# Patient Record
Sex: MALE | Race: WHITE | NOT HISPANIC OR LATINO | URBAN - METROPOLITAN AREA
[De-identification: names, ages, dates, MRNs, and addresses within clinical notes are randomized per-mention and may not be internally consistent; named-entity substitution may affect disease eponyms.]

---

## 2024-07-24 PROBLEM — G40.909 SEIZURE DISORDER (MULTI): Status: ACTIVE | Noted: 2024-07-24

## 2024-08-06 ENCOUNTER — TELEPHONE (OUTPATIENT)
Dept: NEUROLOGY | Facility: HOSPITAL | Age: 34
End: 2024-08-06

## 2024-08-06 DIAGNOSIS — G40.909 SEIZURE DISORDER (MULTI): Primary | ICD-10-CM

## 2024-08-06 RX ORDER — LEVETIRACETAM 750 MG/1
750 TABLET ORAL 2 TIMES DAILY
Qty: 60 TABLET | Refills: 2 | Status: SHIPPED | OUTPATIENT
Start: 2024-08-06 | End: 2024-11-04

## 2024-10-21 DIAGNOSIS — G40.909 SEIZURE DISORDER (MULTI): ICD-10-CM

## 2024-10-21 RX ORDER — LEVETIRACETAM 750 MG/1
750 TABLET ORAL 2 TIMES DAILY
Qty: 60 TABLET | Refills: 2 | Status: SHIPPED | OUTPATIENT
Start: 2024-10-21 | End: 2025-01-19

## 2024-10-21 NOTE — TELEPHONE ENCOUNTER
Copied from CRM #[id number]. Topic: Transfer to Department for Scheduling  >> Oct 21, 2024  3:37 PM Ching MATOS wrote:  Patient needs a refill on medication and a follow up appointment insurance doesn't go into effect until nov 1. Please reach out for scheduling and med refill.        --------------  Called and spoke to patient. Scheduled appointment with Ne in November (new insurance is not active until 11/1/24).  Needs STAT refill of Keppra 750mg 1 tab BID... last dose is tonight on 10/21/24.

## 2024-10-23 ENCOUNTER — APPOINTMENT (OUTPATIENT)
Dept: NEUROLOGY | Facility: HOSPITAL | Age: 34
End: 2024-10-23
Payer: COMMERCIAL

## 2024-11-19 ENCOUNTER — OFFICE VISIT (OUTPATIENT)
Dept: NEUROLOGY | Facility: HOSPITAL | Age: 34
End: 2024-11-19
Payer: COMMERCIAL

## 2024-11-19 ENCOUNTER — LAB (OUTPATIENT)
Dept: LAB | Facility: LAB | Age: 34
End: 2024-11-19
Payer: COMMERCIAL

## 2024-11-19 VITALS
BODY MASS INDEX: 22.26 KG/M2 | HEART RATE: 75 BPM | WEIGHT: 142.1 LBS | SYSTOLIC BLOOD PRESSURE: 130 MMHG | DIASTOLIC BLOOD PRESSURE: 88 MMHG

## 2024-11-19 DIAGNOSIS — F41.9 ANXIETY AND DEPRESSION: ICD-10-CM

## 2024-11-19 DIAGNOSIS — G40.909 SEIZURE DISORDER (MULTI): ICD-10-CM

## 2024-11-19 DIAGNOSIS — G40.909 SEIZURE DISORDER (MULTI): Primary | ICD-10-CM

## 2024-11-19 DIAGNOSIS — F32.A ANXIETY AND DEPRESSION: ICD-10-CM

## 2024-11-19 LAB
ALBUMIN SERPL BCP-MCNC: 4.3 G/DL (ref 3.4–5)
ALP SERPL-CCNC: 84 U/L (ref 33–120)
ALT SERPL W P-5'-P-CCNC: 43 U/L (ref 10–52)
ANION GAP SERPL CALC-SCNC: 9 MMOL/L (ref 10–20)
AST SERPL W P-5'-P-CCNC: 56 U/L (ref 9–39)
BILIRUB SERPL-MCNC: 0.7 MG/DL (ref 0–1.2)
BUN SERPL-MCNC: 15 MG/DL (ref 6–23)
CALCIUM SERPL-MCNC: 9.2 MG/DL (ref 8.6–10.3)
CHLORIDE SERPL-SCNC: 101 MMOL/L (ref 98–107)
CO2 SERPL-SCNC: 32 MMOL/L (ref 21–32)
CREAT SERPL-MCNC: 0.87 MG/DL (ref 0.5–1.3)
EGFRCR SERPLBLD CKD-EPI 2021: >90 ML/MIN/1.73M*2
ERYTHROCYTE [DISTWIDTH] IN BLOOD BY AUTOMATED COUNT: 13.2 % (ref 11.5–14.5)
GLUCOSE SERPL-MCNC: 112 MG/DL (ref 74–99)
HCT VFR BLD AUTO: 46.5 % (ref 41–52)
HGB BLD-MCNC: 15.7 G/DL (ref 13.5–17.5)
MCH RBC QN AUTO: 33.6 PG (ref 26–34)
MCHC RBC AUTO-ENTMCNC: 33.8 G/DL (ref 32–36)
MCV RBC AUTO: 100 FL (ref 80–100)
NRBC BLD-RTO: 0 /100 WBCS (ref 0–0)
PLATELET # BLD AUTO: 156 X10*3/UL (ref 150–450)
POTASSIUM SERPL-SCNC: 3.7 MMOL/L (ref 3.5–5.3)
PROT SERPL-MCNC: 6.6 G/DL (ref 6.4–8.2)
RBC # BLD AUTO: 4.67 X10*6/UL (ref 4.5–5.9)
SODIUM SERPL-SCNC: 138 MMOL/L (ref 136–145)
WBC # BLD AUTO: 5.4 X10*3/UL (ref 4.4–11.3)

## 2024-11-19 PROCEDURE — 99214 OFFICE O/P EST MOD 30 MIN: CPT | Performed by: NURSE PRACTITIONER

## 2024-11-19 PROCEDURE — 36415 COLL VENOUS BLD VENIPUNCTURE: CPT

## 2024-11-19 PROCEDURE — 85027 COMPLETE CBC AUTOMATED: CPT

## 2024-11-19 PROCEDURE — 80053 COMPREHEN METABOLIC PANEL: CPT

## 2024-11-19 RX ORDER — BUSPIRONE HYDROCHLORIDE 5 MG/1
5 TABLET ORAL 3 TIMES DAILY PRN
Qty: 90 TABLET | Refills: 11 | Status: SHIPPED | OUTPATIENT
Start: 2024-11-19 | End: 2025-11-19

## 2024-11-19 RX ORDER — CETIRIZINE HYDROCHLORIDE 10 MG/1
TABLET, CHEWABLE ORAL DAILY
COMMUNITY

## 2024-11-19 RX ORDER — LORATADINE 10 MG
10 TABLET,DISINTEGRATING ORAL DAILY
COMMUNITY

## 2024-11-19 RX ORDER — ZINC GLUCONATE 50 MG
TABLET ORAL DAILY
COMMUNITY

## 2024-11-19 RX ORDER — CALCIUM CARBONATE 300MG(750)
TABLET,CHEWABLE ORAL DAILY
COMMUNITY

## 2024-11-19 RX ORDER — IBUPROFEN 100 MG/5ML
1000 SUSPENSION, ORAL (FINAL DOSE FORM) ORAL DAILY
COMMUNITY

## 2024-11-19 RX ORDER — LEVETIRACETAM 750 MG/1
750 TABLET ORAL 2 TIMES DAILY
Qty: 60 TABLET | Refills: 11 | Status: SHIPPED | OUTPATIENT
Start: 2024-11-19 | End: 2025-11-19

## 2024-11-19 RX ORDER — MELATONIN 10 MG
TABLET, SUBLINGUAL SUBLINGUAL
COMMUNITY

## 2024-11-19 RX ORDER — SERTRALINE HYDROCHLORIDE 25 MG/1
25 TABLET, FILM COATED ORAL DAILY
Qty: 30 TABLET | Refills: 11 | Status: SHIPPED | OUTPATIENT
Start: 2024-11-19 | End: 2025-11-19

## 2024-11-19 RX ORDER — MIDAZOLAM 5 MG/.1ML
5 SPRAY NASAL AS NEEDED
Qty: 6 EACH | Refills: 3 | Status: SHIPPED | OUTPATIENT
Start: 2024-11-19

## 2024-11-19 RX ORDER — VIT C/E/ZN/COPPR/LUTEIN/ZEAXAN 250MG-90MG
CAPSULE ORAL DAILY
COMMUNITY

## 2024-11-19 ASSESSMENT — ENCOUNTER SYMPTOMS
LOSS OF SENSATION IN FEET: 0
DEPRESSION: 1
OCCASIONAL FEELINGS OF UNSTEADINESS: 0

## 2024-11-19 ASSESSMENT — PATIENT HEALTH QUESTIONNAIRE - PHQ9
9. THOUGHTS THAT YOU WOULD BE BETTER OFF DEAD, OR OF HURTING YOURSELF: NOT AT ALL
SUM OF ALL RESPONSES TO PHQ9 QUESTIONS 1 AND 2: 6
1. LITTLE INTEREST OR PLEASURE IN DOING THINGS: NEARLY EVERY DAY
5. POOR APPETITE OR OVEREATING: NEARLY EVERY DAY
7. TROUBLE CONCENTRATING ON THINGS, SUCH AS READING THE NEWSPAPER OR WATCHING TELEVISION: NEARLY EVERY DAY
10. IF YOU CHECKED OFF ANY PROBLEMS, HOW DIFFICULT HAVE THESE PROBLEMS MADE IT FOR YOU TO DO YOUR WORK, TAKE CARE OF THINGS AT HOME, OR GET ALONG WITH OTHER PEOPLE: VERY DIFFICULT
SUM OF ALL RESPONSES TO PHQ QUESTIONS 1-9: 22
3. TROUBLE FALLING OR STAYING ASLEEP OR SLEEPING TOO MUCH: MORE THAN HALF THE DAYS
4. FEELING TIRED OR HAVING LITTLE ENERGY: NEARLY EVERY DAY
6. FEELING BAD ABOUT YOURSELF - OR THAT YOU ARE A FAILURE OR HAVE LET YOURSELF OR YOUR FAMILY DOWN: NEARLY EVERY DAY
2. FEELING DOWN, DEPRESSED OR HOPELESS: NEARLY EVERY DAY
8. MOVING OR SPEAKING SO SLOWLY THAT OTHER PEOPLE COULD HAVE NOTICED. OR THE OPPOSITE, BEING SO FIGETY OR RESTLESS THAT YOU HAVE BEEN MOVING AROUND A LOT MORE THAN USUAL: MORE THAN HALF THE DAYS

## 2024-11-19 ASSESSMENT — COLUMBIA-SUICIDE SEVERITY RATING SCALE - C-SSRS
6. HAVE YOU EVER DONE ANYTHING, STARTED TO DO ANYTHING, OR PREPARED TO DO ANYTHING TO END YOUR LIFE?: NO
1. IN THE PAST MONTH, HAVE YOU WISHED YOU WERE DEAD OR WISHED YOU COULD GO TO SLEEP AND NOT WAKE UP?: NO
2. HAVE YOU ACTUALLY HAD ANY THOUGHTS OF KILLING YOURSELF?: NO

## 2024-11-19 ASSESSMENT — PAIN SCALES - GENERAL: PAINLEVEL_OUTOF10: 0-NO PAIN

## 2024-11-19 NOTE — PROGRESS NOTES
"[facility]    SUBJECTIVE    Chano MEDELLIN Diaz is a 34 y.o. right-handed male who presents with   Chief Complaint   Patient presents with    Seizures     1 year FU, former pt of Dr Solis        Presents for follow up visit  Visit type: in-clinic visit    HISTORY OF PRESENT ILLNESS    RECAP:  Former patient of Dr. Solis, last seen July 2023.   First saw 6/1/22 for sz. Prev pt of Dr Olson. 2014 apparently had aseptic meningitis, HIV diagnosed during that admission. Started on anti-retroviral, viral load had been undetectable since. 2019 started having sz. Semiology: blank out, collapse, convulse x30 seconds, wake up disoriented, don't become coherent of surroundings for ~10-15 min. Has had two sz lifetime. 8/2019 and 12/2019. ER started on levetiracetam 500mg bid the second time. Never had any since levetiracetam started. Both sz he states he was under a lot of physical and emotional stress. 2nd sz alcohol may have been a factor. Currently on levetiracetam 500mg 1.5 tabs bid. Doing well. No sz. Dose increased due to low levetiracetam level to current dose. No dizziness. No tiredness. No SE. HIV, on antiretrovirals. Undetectable viral load since 2014. CD4 and CD8 good. I do not have any records of this. Some days he drinks etoh, some days not. Most days drinks etoh. Drinks 1-10 cans beer/day, or glasses of wine, or shots of liquor. Doesn't want to quit. No HA. No prior hx known seizure (none prior to 2019). Never was on AED as a child. Born FT. No developmental delay. Smoke tobacco, 1/2 ppd. No street drug use, no marijuana. During last visit, discussed alcohol cessation and change LVT to 750mg pill, continue 1 tab bid.     2019 EEG normal   2019 MRI brain wwo normal     ID doctor: [facility] Dr Armand Ervin     Last sz 2019. On LVT 750mg bid, doing well, denies SE.     Drinks 3-5 drinks \"a few times a week\". He is not interested in quitting alcohol. He states he has been \"moderating\" " alcohol use.     No major medical issues since last visit. States HIV stable.     11/19/24 - pt alone for today's follow up visit.     Pt states he has had a rough year. Did not have insurance from march until november so missed his appt with me in July. C/o depression anxiety, and relationship issues. Not sleeping well, mind racing. No appetite.     No seizures. Patient denies any auras, shaking, jerking, convulsions, loss of time, zoning out, waking up with tongue or cheek bites, incontinence or unexplained bruising.    Stress is not improving. Reaching out to counselors/therapist to find one he feels comfortable with and accepts his insurance.      He is a . Works until 1-3 am, goes to bed 4-5 am, may not fall asleep 7-8 am. Sleeping 4-14 hours, depends.       Drinking more days than not recently. Between 2-10 drinks. Canned cocktails.     Taking keppra at 3 am and 3 pm typically. Tolerating well.     REVIEW OF SYSTEMS:  10 point review of systems performed and is negative except as noted in the HPI.     Past Medical History:   Diagnosis Date    Personal history of other specified conditions 03/10/2021    History of dysuria       No relevant family history has been documented for this patient.    History reviewed. No pertinent surgical history.    Social History     Substance and Sexual Activity   Drug Use Not Currently    Types: Marijuana     Tobacco Use: High Risk (11/19/2024)    Patient History     Smoking Tobacco Use: Every Day     Smokeless Tobacco Use: Never     Passive Exposure: Not on file     Alcohol Use: Alcohol Misuse (3/4/2019)    Received from [facility]    AUDIT-C     Frequency of Alcohol Consumption: 2-3 times a week     Average Number of Drinks: 3 or 4     Frequency of Binge Drinking: Monthly       Current Outpatient Medications   Medication Instructions    busPIRone (BUSPAR) 5 mg, oral, 3 times daily PRN    cetirizine (ZyrTEC) 10 mg chewable tablet Daily     emtricitab/rilpiviri/tenof ala (ODEFSEY ORAL) Take by mouth.    levETIRAcetam (KEPPRA) 750 mg, oral, 2 times daily    loratadine (CLARITIN REDITABS) 10 mg, Daily    melatonin 10 mg tablet, sublingual Place under the tongue. Takes 5-10 mg prn hs    nasal spray midazolam (Nayzilam) 5 mg/spray (0.1 mL) spray,non-aerosol 1 spray, nasal, As needed    sertraline (ZOLOFT) 25 mg, oral, Daily         OBJECTIVE    /88   Pulse 75   Wt 64.5 kg (142 lb 1.6 oz)   BMI 22.26 kg/m²       Physical Exam  Neurological Exam      EEG     Narrative  Ordered by an unspecified provider.      Lab Results   Component Value Date    WBC 5.4 11/19/2024    RBC 4.67 11/19/2024    HGB 15.7 11/19/2024    HCT 46.5 11/19/2024     11/19/2024     11/19/2024    K 3.7 11/19/2024     11/19/2024    BUN 15 11/19/2024    CREATININE 0.87 11/19/2024    EGFR >90 11/19/2024    CALCIUM 9.2 11/19/2024    ALKPHOS 84 11/19/2024    AST 56 (H) 11/19/2024    ALT 43 11/19/2024          ASSESSMENT & PLAN  Problem List Items Addressed This Visit       Seizure disorder (Multi) - Primary    Overview     Possibly generalized  In background of history of aseptic meningitis (2014) and ongoing alcohol abuse  No other AED  2019 EEG wnl, 2019 MRI brain wwo wnl  Last sz 2019 per pt  On LVT 7500mg bid         Relevant Medications    levETIRAcetam (Keppra) 750 mg tablet    nasal spray midazolam (Nayzilam) 5 mg/spray (0.1 mL) spray,non-aerosol    Other Relevant Orders    CBC (Completed)    Comprehensive Metabolic Panel (Completed)    Follow Up In Neurology    Anxiety and depression    Relevant Medications    sertraline (Zoloft) 25 mg tablet    busPIRone (Buspar) 5 mg tablet     Continue Keppra, refills sent. Discussed improving possible triggers/factors that can decrease seizure threshold including sleep deprivation, alcohol use and stress. Will try to start some medications to help with stress and sleep. Discussed some options, will start sertraline due to  his ETOH use, start at 25 mg prior to sleep. Start Buspar 5-10 mg up to TID PRN for anxiety. Pt agreeable to this. Cut back on ETOH use. Pt states understanding. Additionally discussed seizure rescue medication, Nayzilam ordered, given instructions on dosing and administration, copay card given.     FU in 2 months         Ne Alba, APRN-CNP

## 2025-01-06 ENCOUNTER — TELEMEDICINE (OUTPATIENT)
Dept: NEUROLOGY | Facility: HOSPITAL | Age: 35
End: 2025-01-06
Payer: COMMERCIAL

## 2025-01-06 ENCOUNTER — TELEPHONE (OUTPATIENT)
Dept: NEUROLOGY | Facility: HOSPITAL | Age: 35
End: 2025-01-06

## 2025-01-06 DIAGNOSIS — F41.9 ANXIETY AND DEPRESSION: ICD-10-CM

## 2025-01-06 DIAGNOSIS — F51.04 CHRONIC INSOMNIA: ICD-10-CM

## 2025-01-06 DIAGNOSIS — F32.A ANXIETY AND DEPRESSION: ICD-10-CM

## 2025-01-06 DIAGNOSIS — G40.909 SEIZURE DISORDER (MULTI): Primary | ICD-10-CM

## 2025-01-06 PROCEDURE — 99214 OFFICE O/P EST MOD 30 MIN: CPT | Performed by: NURSE PRACTITIONER

## 2025-01-06 PROCEDURE — 99214 OFFICE O/P EST MOD 30 MIN: CPT | Mod: 95 | Performed by: NURSE PRACTITIONER

## 2025-01-06 RX ORDER — TRAZODONE HYDROCHLORIDE 50 MG/1
50 TABLET ORAL NIGHTLY PRN
Qty: 60 TABLET | Refills: 2 | Status: SHIPPED | OUTPATIENT
Start: 2025-01-06 | End: 2025-04-06

## 2025-01-06 RX ORDER — SERTRALINE HYDROCHLORIDE 25 MG/1
25 TABLET, FILM COATED ORAL DAILY
Qty: 30 TABLET | Refills: 11 | Status: SHIPPED | OUTPATIENT
Start: 2025-01-06 | End: 2026-01-06

## 2025-01-06 RX ORDER — BUSPIRONE HYDROCHLORIDE 10 MG/1
10 TABLET ORAL 3 TIMES DAILY
Qty: 90 TABLET | Refills: 11 | Status: SHIPPED | OUTPATIENT
Start: 2025-01-06 | End: 2026-01-06

## 2025-01-06 RX ORDER — SERTRALINE HYDROCHLORIDE 50 MG/1
50 TABLET, FILM COATED ORAL DAILY
Qty: 30 TABLET | Refills: 11 | Status: SHIPPED | OUTPATIENT
Start: 2025-01-06 | End: 2025-01-06

## 2025-01-06 NOTE — TELEPHONE ENCOUNTER
"Called [facility] (provider number on insurance card).  Spoke to \"Mar\" who took my info and stated his dept does not work on medication PA's/appeals, that I must call the Pharmacy dept at [phone].  I called this number and spoke to \"Jennifer\" who states the pharmacy dept does not take care of [place] members.  She is \"warm transferring\" me back to the original dept after taking info.      I then was transferred to provider services and spoke to \"Jet\" who states I was transferred incorrectly from this dept at the beginning of my call.  Should've been transferred to Medical auth dept.  I received call reference# [id number].    Transferring me to Medical Authorization Dept now.  Well, that went awry.  I was transferred to the original provider number with recording and had to start all over again.  41 minutes, most of it on hold,  with no resolution.    Ne walked in and states she will take care of this.  "

## 2025-01-06 NOTE — ASSESSMENT & PLAN NOTE
Doing well with medications. Buspar is helping but still having anxiety and more social anxiety. Taking 5 mg TID consistently. Will increase to 10 mg TID.   Discussed increase in sertraline for anxiety as well but pt is having some decreased sex drive on it so will hold off.   Still having trouble sleeping, will try  mg trazodone PRN for sleep. Pt agreeable to this plan.

## 2025-01-06 NOTE — PROGRESS NOTES
[facility]    SUBJECTIVE    Chano Diaz is a 34 y.o. right-handed male who presents with No chief complaint on file.       Presents for follow up visit  Visit type: virtual visit Virtual or Telephone Consent    An interactive audio and video telecommunication system which permits real time communications between the patient (at the originating site) and provider (at the distant site) was utilized to provide this telehealth service.   Verbal consent was requested and obtained from Chano Diaz on this date, 01/06/25 for a telehealth visit.     HISTORY OF PRESENT ILLNESS    RECAP:  Former patient of Dr. Solis. First saw 6/1/22 for sz. Prev pt of Dr Olson. 2014 apparently had aseptic meningitis, HIV diagnosed during that admission. Started on anti-retroviral, viral load had been undetectable since. 2019 started having sz. Semiology: blank out, collapse, convulse x30 seconds, wake up disoriented, don't become coherent of surroundings for ~10-15 min. Has had two sz lifetime. 8/2019 and 12/2019. ER started on levetiracetam 500mg bid the second time. Never had any since levetiracetam started. Both sz he states he was under a lot of physical and emotional stress. 2nd sz alcohol may have been a factor. Currently on levetiracetam 500mg 1.5 tabs bid. Doing well. No sz. Dose increased due to low levetiracetam level to current dose. No dizziness. No tiredness. No SE. HIV, on antiretrovirals. Undetectable viral load since 2014. CD4 and CD8 good. I do not have any records of this. Some days he drinks etoh, some days not. Most days drinks etoh. Drinks 1-10 cans beer/day, or glasses of wine, or shots of liquor. Doesn't want to quit. No HA. No prior hx known seizure (none prior to 2019). Never was on AED as a child. Born FT. No developmental delay. Smoke tobacco, 1/2 ppd. No street drug use, no marijuana. During last visit, discussed alcohol cessation and change LVT to 750mg pill, continue 1 tab bid.    "  2019 EEG normal   2019 MRI brain wwo normal     ID doctor: [facility] Dr Armand Ervin     Last sz 2019. On LVT 750mg bid, doing well, denies SE.     Drinks 3-5 drinks \"a few times a week\". He is not interested in quitting alcohol. He states he has been \"moderating\" alcohol use.     No major medical issues since last visit. States HIV stable.     11/19/24 - Pt states he has had a rough year. Did not have insurance from march until november so missed his appt with me in July. C/o depression anxiety, and relationship issues. Not sleeping well, mind racing. No appetite.      No seizures. Patient denies any auras, shaking, jerking, convulsions, loss of time, zoning out, waking up with tongue or cheek bites, incontinence or unexplained bruising.     Stress is not improving. Reaching out to counselors/therapist to find one he feels comfortable with and accepts his insurance.       He is a . Works until 1-3 am, goes to bed 4-5 am, may not fall asleep 7-8 am. Sleeping 4-14 hours, depends.        Drinking more days than not recently. Between 2-10 drinks. Canned cocktails.      Taking keppra at 3 am and 3 pm typically. Tolerating well.     Continue Keppra, refills sent. Discussed improving possible triggers/factors that can decrease seizure threshold including sleep deprivation, alcohol use and stress. Will try to start some medications to help with stress and sleep. Discussed some options, will start sertraline due to his ETOH use, start at 25 mg prior to sleep. Start Buspar 5-10 mg up to TID PRN for anxiety. Pt agreeable to this. Cut back on ETOH use. Pt states understanding. Additionally discussed seizure rescue medication, Nayzilam ordered, given instructions on dosing and administration, copay card given.     01/06/25 - Presents on call alone.     Since last visit, Nayzilam was denied by insurance. This was appealed, have not heard back.     Having more better days now per pt. Sleeping somewhat " better but not consistently. Still some trouble falling asleep. Once asleep, sometimes still waking up.     Sertraline possibly helping some with the mood. Sober x 1 week. A little dizziness with it. Having decreased sex drive.     Buspar helping with general anxiety but noticing social anxiety, taking PRN but has noticed that he is consistently taking it 3 times per day. The social anxiety is significant.     REVIEW OF SYSTEMS:  10 point review of systems performed and is negative except as noted in the HPI.     Past Medical History:   Diagnosis Date    Personal history of other specified conditions 03/10/2021    History of dysuria       No relevant family history has been documented for this patient.    History reviewed. No pertinent surgical history.    Social History     Substance and Sexual Activity   Drug Use Not Currently    Types: Marijuana     Tobacco Use: High Risk (1/6/2025)    Patient History     Smoking Tobacco Use: Every Day     Smokeless Tobacco Use: Never     Passive Exposure: Not on file     Alcohol Use: Alcohol Misuse (3/4/2019)    Received from [facility]    AUDIT-C     Frequency of Alcohol Consumption: 2-3 times a week     Average Number of Drinks: 3 or 4     Frequency of Binge Drinking: Monthly       Current Outpatient Medications   Medication Instructions    ascorbic acid (VITAMIN C) 1,000 mg, Daily    B complex-vitamin C-folic acid (Nephro-Dami Rx) 1- mg-mg-mcg tablet 1 tablet, Daily with breakfast    busPIRone (BUSPAR) 10 mg, oral, 3 times daily    cetirizine (ZyrTEC) 10 mg chewable tablet Daily    cholecalciferol (Vitamin D-3) 25 MCG (1000 UT) capsule Daily    CREATINE, BULK, MISC No dose, route, or frequency recorded.    emtricitab/rilpiviri/tenof ala (ODEFSEY ORAL) Take by mouth.    levETIRAcetam (KEPPRA) 750 mg, oral, 2 times daily    loratadine (CLARITIN REDITABS) 10 mg, Daily    magnesium oxide (Mag-Ox) 400 mg tablet Daily    melatonin 10 mg tablet,  sublingual Place under the tongue. Takes 5-10 mg prn hs    nasal spray midazolam (Nayzilam) 5 mg/spray (0.1 mL) spray,non-aerosol 1 spray, nasal, As needed    sertraline (ZOLOFT) 25 mg, oral, Daily    traZODone (DESYREL) 50 mg, oral, Nightly PRN    zinc gluconate 50 mg tablet Daily         OBJECTIVE    There were no vitals taken for this visit.      Physical Exam  Eyes:      General: Lids are normal.      Extraocular Movements: Extraocular movements intact.   Psychiatric:         Speech: Speech normal.       Neurological Exam  Mental Status  Awake, alert and oriented to person, place and time. Speech is normal. Language is fluent with no aphasia. Attention and concentration are normal. Fund of knowledge is appropriate for level of education.    Cranial Nerves  CN III, IV, VI: Extraocular movements intact bilaterally. Normal lids and orbits bilaterally.  CN VII: Full and symmetric facial movement.  CN VIII: Hearing is normal.    Motor   No abnormal involuntary movements.        No results found for this or any previous visit from the past 1825 days.      Lab Results   Component Value Date    WBC 5.4 11/19/2024    RBC 4.67 11/19/2024    HGB 15.7 11/19/2024    HCT 46.5 11/19/2024     11/19/2024     11/19/2024    K 3.7 11/19/2024     11/19/2024    BUN 15 11/19/2024    CREATININE 0.87 11/19/2024    EGFR >90 11/19/2024    CALCIUM 9.2 11/19/2024    ALKPHOS 84 11/19/2024    AST 56 (H) 11/19/2024    ALT 43 11/19/2024          ASSESSMENT & PLAN  Problem List Items Addressed This Visit       Seizure disorder (Multi) - Primary    Overview     Possibly generalized  In background of history of aseptic meningitis (2014) and ongoing alcohol abuse  No other AED  2019 EEG wnl, 2019 MRI brain wwo wnl  Last sz 2019 per pt  On LVT 7500mg bid         Current Assessment & Plan     Continue [facility]  Staff to check on PA appeal for nayzilam and we can let pt know.          Relevant Orders    Follow Up In Neurology     Anxiety and depression    Overview     On Sertraline 25 mg nightly  On Buspar 5-10 mg PRN for anxiety         Current Assessment & Plan     Doing well with medications. Buspar is helping but still having anxiety and more social anxiety. Taking 5 mg TID consistently. Will increase to 10 mg TID.   Discussed increase in sertraline for anxiety as well but pt is having some decreased sex drive on it so will hold off.   Still having trouble sleeping, will try  mg trazodone PRN for sleep. Pt agreeable to this plan.          Relevant Medications    busPIRone (Buspar) 10 mg tablet    sertraline (Zoloft) 25 mg tablet    traZODone (Desyrel) 50 mg tablet    Other Relevant Orders    Follow Up In Neurology     Other Visit Diagnoses       Chronic insomnia        Relevant Medications    traZODone (Desyrel) 50 mg tablet    Other Relevant Orders    Follow Up In Neurology          Discussed possible referral to psych NP in the future if needed. Pt states understanding.     FU in 6 weeks         Ne Alba, APRN-CNP

## 2025-01-13 ENCOUNTER — HOSPITAL ENCOUNTER (OUTPATIENT)
Dept: RADIOLOGY | Facility: CLINIC | Age: 35
Discharge: HOME | End: 2025-01-13
Payer: COMMERCIAL

## 2025-01-13 ENCOUNTER — APPOINTMENT (OUTPATIENT)
Dept: PRIMARY CARE | Facility: CLINIC | Age: 35
End: 2025-01-13
Payer: COMMERCIAL

## 2025-01-13 VITALS
TEMPERATURE: 96 F | HEIGHT: 68 IN | DIASTOLIC BLOOD PRESSURE: 71 MMHG | BODY MASS INDEX: 22.43 KG/M2 | SYSTOLIC BLOOD PRESSURE: 121 MMHG | HEART RATE: 68 BPM | OXYGEN SATURATION: 95 % | RESPIRATION RATE: 16 BRPM | WEIGHT: 148 LBS

## 2025-01-13 DIAGNOSIS — Z13.220 ENCOUNTER FOR LIPID SCREENING FOR CARDIOVASCULAR DISEASE: ICD-10-CM

## 2025-01-13 DIAGNOSIS — G89.29 CHRONIC PAIN OF LEFT KNEE: ICD-10-CM

## 2025-01-13 DIAGNOSIS — M25.562 CHRONIC PAIN OF LEFT KNEE: ICD-10-CM

## 2025-01-13 DIAGNOSIS — K52.9 CHRONIC DIARRHEA OF UNKNOWN ORIGIN: ICD-10-CM

## 2025-01-13 DIAGNOSIS — M54.2 NECK PAIN, MUSCULOSKELETAL: ICD-10-CM

## 2025-01-13 DIAGNOSIS — Z13.6 ENCOUNTER FOR LIPID SCREENING FOR CARDIOVASCULAR DISEASE: ICD-10-CM

## 2025-01-13 DIAGNOSIS — Z00.00 ROUTINE GENERAL MEDICAL EXAMINATION AT A HEALTH CARE FACILITY: Primary | ICD-10-CM

## 2025-01-13 PROBLEM — G40.909 EPILEPSY: Status: ACTIVE | Noted: 2025-01-13

## 2025-01-13 PROBLEM — R59.9 PALPABLE LYMPH NODE: Status: ACTIVE | Noted: 2025-01-13

## 2025-01-13 PROBLEM — R30.0 DYSURIA: Status: ACTIVE | Noted: 2025-01-13

## 2025-01-13 PROCEDURE — 3008F BODY MASS INDEX DOCD: CPT | Performed by: STUDENT IN AN ORGANIZED HEALTH CARE EDUCATION/TRAINING PROGRAM

## 2025-01-13 PROCEDURE — 99406 BEHAV CHNG SMOKING 3-10 MIN: CPT | Performed by: STUDENT IN AN ORGANIZED HEALTH CARE EDUCATION/TRAINING PROGRAM

## 2025-01-13 PROCEDURE — 73562 X-RAY EXAM OF KNEE 3: CPT | Mod: LEFT SIDE | Performed by: RADIOLOGY

## 2025-01-13 PROCEDURE — 99203 OFFICE O/P NEW LOW 30 MIN: CPT | Performed by: STUDENT IN AN ORGANIZED HEALTH CARE EDUCATION/TRAINING PROGRAM

## 2025-01-13 PROCEDURE — 73562 X-RAY EXAM OF KNEE 3: CPT | Mod: LT

## 2025-01-13 PROCEDURE — 99385 PREV VISIT NEW AGE 18-39: CPT | Performed by: STUDENT IN AN ORGANIZED HEALTH CARE EDUCATION/TRAINING PROGRAM

## 2025-01-13 RX ORDER — TIZANIDINE 2 MG/1
2 TABLET ORAL NIGHTLY PRN
Qty: 30 TABLET | Refills: 1 | Status: SHIPPED | OUTPATIENT
Start: 2025-01-13 | End: 2025-03-14

## 2025-01-13 SDOH — ECONOMIC STABILITY: FOOD INSECURITY: WITHIN THE PAST 12 MONTHS, THE FOOD YOU BOUGHT JUST DIDN'T LAST AND YOU DIDN'T HAVE MONEY TO GET MORE.: NEVER TRUE

## 2025-01-13 SDOH — ECONOMIC STABILITY: FOOD INSECURITY: WITHIN THE PAST 12 MONTHS, YOU WORRIED THAT YOUR FOOD WOULD RUN OUT BEFORE YOU GOT MONEY TO BUY MORE.: NEVER TRUE

## 2025-01-13 ASSESSMENT — PROMIS GLOBAL HEALTH SCALE
RATE_AVERAGE_FATIGUE: MODERATE
RATE_PHYSICAL_HEALTH: GOOD
RATE_AVERAGE_PAIN: 5
CARRYOUT_SOCIAL_ACTIVITIES: GOOD
RATE_SOCIAL_SATISFACTION: GOOD
RATE_GENERAL_HEALTH: GOOD
CARRYOUT_PHYSICAL_ACTIVITIES: MOSTLY
RATE_QUALITY_OF_LIFE: GOOD
RATE_MENTAL_HEALTH: FAIR
EMOTIONAL_PROBLEMS: OFTEN

## 2025-01-13 ASSESSMENT — ENCOUNTER SYMPTOMS
SHORTNESS OF BREATH: 0
NECK PAIN: 1
DIARRHEA: 0
COLOR CHANGE: 0
NAUSEA: 0
NECK STIFFNESS: 1
CONFUSION: 0
CONSTIPATION: 0
FATIGUE: 0
ARTHRALGIAS: 1
WHEEZING: 0
ABDOMINAL PAIN: 0
CHILLS: 0
UNEXPECTED WEIGHT CHANGE: 0
DIZZINESS: 0
COUGH: 0
FEVER: 0
PALPITATIONS: 0
VOMITING: 0
HEADACHES: 0

## 2025-01-13 ASSESSMENT — PATIENT HEALTH QUESTIONNAIRE - PHQ9
7. TROUBLE CONCENTRATING ON THINGS, SUCH AS READING THE NEWSPAPER OR WATCHING TELEVISION: MORE THAN HALF THE DAYS
8. MOVING OR SPEAKING SO SLOWLY THAT OTHER PEOPLE COULD HAVE NOTICED. OR THE OPPOSITE, BEING SO FIGETY OR RESTLESS THAT YOU HAVE BEEN MOVING AROUND A LOT MORE THAN USUAL: MORE THAN HALF THE DAYS
2. FEELING DOWN, DEPRESSED OR HOPELESS: MORE THAN HALF THE DAYS
SUM OF ALL RESPONSES TO PHQ QUESTIONS 1-9: 16
3. TROUBLE FALLING OR STAYING ASLEEP: MORE THAN HALF THE DAYS
9. THOUGHTS THAT YOU WOULD BE BETTER OFF DEAD, OR OF HURTING YOURSELF: NOT AT ALL
4. FEELING TIRED OR HAVING LITTLE ENERGY: MORE THAN HALF THE DAYS
1. LITTLE INTEREST OR PLEASURE IN DOING THINGS: MORE THAN HALF THE DAYS
SUM OF ALL RESPONSES TO PHQ9 QUESTIONS 1 & 2: 4
6. FEELING BAD ABOUT YOURSELF - OR THAT YOU ARE A FAILURE OR HAVE LET YOURSELF OR YOUR FAMILY DOWN: MORE THAN HALF THE DAYS
5. POOR APPETITE OR OVEREATING: MORE THAN HALF THE DAYS

## 2025-01-13 ASSESSMENT — LIFESTYLE VARIABLES
SKIP TO QUESTIONS 9-10: 1
HOW MANY STANDARD DRINKS CONTAINING ALCOHOL DO YOU HAVE ON A TYPICAL DAY: PATIENT DOES NOT DRINK
AUDIT-C TOTAL SCORE: 0
HOW OFTEN DO YOU HAVE A DRINK CONTAINING ALCOHOL: NEVER
HOW OFTEN DO YOU HAVE SIX OR MORE DRINKS ON ONE OCCASION: NEVER

## 2025-01-13 NOTE — PROGRESS NOTES
Subjective   Patient ID: Chano Diaz is a 34 y.o. male who presents for New Patient Visit (Pt to establish with PCP) and Knee Pain (Pt fell 2 years ago and injured left knee.  Pt didn't seek medical care, but is still having issues with pain and difficulty walking.).  He is a new pt here to [facility], annual and few concerns. Reports he is L knee pain x 2 yrs after a fall, reports sometime difficulty walking, or other ROM but not all the time. Also having some L shoulder/neck base pain x 6 mo.   He is following with neuro for seizure & is on Keppra; also on few other psych meds for anxiety/depression & insomnia, managed by neuro too. He is following with therapist too. He is also following with ID for management of HIV at [facility].   He is a smoker, 1 ppd & is trying to cut down on cigs, using nicotine patch now.     # HM   Self health assessment:  okay   Concern: as above   Occupation:    Living With: self     Sleep: okay   Exercise: regular   Diet: mixed   Tobacco: Yes, describe: 1ppd x 17 yrs  Alcohol: Alcohol Use: Yes, patient drinks alcohol. Frequency: sober for last 2 wks, prior 4-10 drinks about 4-5 weekly.  Sexually active: Yes, active with male partner, STDS test at ID office      Dentist: not recently, planning to see soon   Eye doctor: not recently, planning to see soon    Hearing issues: No    Family history of colon cancer: no  Last colonoscopy & result: sigmoidoscopy for having diarrhea, reports still having diarrhea.   Family history of prostate cancer: none   History of abnormal lipids: no    Review of Systems   Constitutional:  Negative for chills, fatigue, fever and unexpected weight change.   HENT: Negative.     Respiratory:  Negative for cough, shortness of breath and wheezing.    Cardiovascular:  Negative for chest pain, palpitations and leg swelling.   Gastrointestinal:  Negative for abdominal pain, constipation, diarrhea, nausea and vomiting.   Musculoskeletal:  Positive for  "arthralgias, neck pain and neck stiffness.   Skin:  Negative for color change and rash.   Neurological:  Negative for dizziness and headaches.   Psychiatric/Behavioral:  Negative for behavioral problems and confusion.         Objective    /71 (BP Location: Right arm, Patient Position: Sitting, BP Cuff Size: Adult)   Pulse 68   Temp 35.6 °C (96 °F) (Temporal)   Resp 16   Ht 1.727 m (5' 8\")   Wt 67.1 kg (148 lb)   SpO2 95%   BMI 22.50 kg/m²  Body mass index is 22.5 kg/m².    Physical Exam  Vitals and nursing note reviewed.   Constitutional:       Appearance: Normal appearance.   HENT:      Right Ear: Tympanic membrane normal.      Left Ear: Tympanic membrane normal.   Eyes:      Extraocular Movements: Extraocular movements intact.      Pupils: Pupils are equal, round, and reactive to light.   Cardiovascular:      Rate and Rhythm: Normal rate and regular rhythm.      Pulses: Normal pulses.      Heart sounds: Normal heart sounds.   Pulmonary:      Effort: Pulmonary effort is normal.      Breath sounds: Normal breath sounds.   Abdominal:      General: Abdomen is flat. Bowel sounds are normal.      Palpations: Abdomen is soft.   Musculoskeletal:         General: Normal range of motion.      Comments: Upper back/neck/L shoulder: not ttp, ROM of head & L shoulder intact.     L knee: mild ttp at below the knee cap anteriorly, no swelling noted. ROM sl limited d/t pain, gait nl. R knee exam nl.    Neurological:      General: No focal deficit present.      Mental Status: He is alert.      Cranial Nerves: No cranial nerve deficit.      Sensory: No sensory deficit.      Motor: No weakness.   Psychiatric:         Mood and Affect: Mood normal.         Behavior: Behavior normal.          Assessment and Plan   He is a new pt here to [facility], annual and few concerns. PMH: Seizure, HIV, anxiety, depression & Insomnia.   He is having chronic L knee pain post fall  about 2 yrs ago, likely OA triggered by fall, will add " Xray to eval further. Also added PT for further mgmt, pending Xray result. Cont tylenol/IBU as needed, rec icing, knee brace as needed too. Neck pain/L shoulder pain likely from muscle stiffness, had nl exam in office; will do trial of Zanaflex at bedtime prn; rec stretching ex and heating packs.   He is also having chronic diarrhea for unclear reason, referred to GI for further evaluation & scopes as needed. Enc fiber rich diet.   Highly enc to follow up with ID & neuro for other chronic care mgmt as usual. Otherwise he is clinically & vitally stable. Plan as follows      #HM   Screening tests:  - Lipid profile: added     Primary prevention:  - Flu shot: due, will get at next time   - COVID vaccines: rec to get booster  - Tdap shot: UTD     Counseling:   - STD counseling:  D/ safe sex practice and rec to use condoms as applies.   - ETOH (age>18): D/ safe drinking habits and advice to cut down on liquor/beers as applies   - Smoking: Advise to cut/quit on smoking. Offers different options to help w/ cessations. Declined resources   - Diet, Weight gain: Advise heart healthy diet (low carbs, low fat; add more fruits/veges and whole grain food) and regular exercise 30mins daily x 5 days per week.  Also rec 10mins of aerobic exercise/jogging daily x 5 days/wk  - Rec Ca (600-1200mg) and Vit D (800-1000U) daily     Others:  - Depression screening: Neg, happy appearing male  - Bld work per EMR, will call for any abn result, pt was made aware   - cont taking all other meds as rx'd    Assessment/Plan   Problem List Items Addressed This Visit    None  Visit Diagnoses         Codes    Routine general medical examination at a health care facility    -  Primary Z00.00    Encounter for lipid screening for cardiovascular disease     Z13.220, Z13.6    Relevant Orders    Lipid Panel    Chronic diarrhea of unknown origin     K52.9    Relevant Orders    Referral to Gastroenterology    Chronic pain of left knee     M25.562, G89.29     Relevant Orders    XR knee left 3 views    Referral to Physical Therapy    Neck pain, musculoskeletal     M54.2    Relevant Medications    tiZANidine (Zanaflex) 2 mg tablet          Rtc 3-4 mo for PAUL Candelario MD   [facility], [facility]

## 2025-01-28 ENCOUNTER — EVALUATION (OUTPATIENT)
Dept: PHYSICAL THERAPY | Facility: HOSPITAL | Age: 35
End: 2025-01-28
Payer: COMMERCIAL

## 2025-01-28 DIAGNOSIS — M62.81 MUSCLE WEAKNESS OF LOWER EXTREMITY: ICD-10-CM

## 2025-01-28 DIAGNOSIS — M25.562 CHRONIC PAIN OF LEFT KNEE: Primary | ICD-10-CM

## 2025-01-28 DIAGNOSIS — G89.29 CHRONIC PAIN OF LEFT KNEE: Primary | ICD-10-CM

## 2025-01-28 PROCEDURE — 97161 PT EVAL LOW COMPLEX 20 MIN: CPT | Mod: GP | Performed by: PHYSICAL THERAPIST

## 2025-01-28 PROCEDURE — 97110 THERAPEUTIC EXERCISES: CPT | Mod: GP | Performed by: PHYSICAL THERAPIST

## 2025-01-28 ASSESSMENT — ENCOUNTER SYMPTOMS
DEPRESSION: 0
LOSS OF SENSATION IN FEET: 0
OCCASIONAL FEELINGS OF UNSTEADINESS: 0

## 2025-01-28 ASSESSMENT — PAIN - FUNCTIONAL ASSESSMENT: PAIN_FUNCTIONAL_ASSESSMENT: 0-10

## 2025-01-28 ASSESSMENT — PAIN SCALES - GENERAL: PAINLEVEL_OUTOF10: 0 - NO PAIN

## 2025-01-28 NOTE — PROGRESS NOTES
Physical Therapy    Physical Therapy Evaluation    Patient Name: Chano Diaz  MRN: [id number]  : 1990  Referring Physician: Ambrose Candelario  Today's Date: 2025  Time Calculation  Start Time: 1525  Stop Time: 1610  Time Calculation (min): 45 min  PT Evaluation Time Entry  PT Evaluation (Low) Time Entry: 30  PT Therapeutic Procedures Time Entry  Therapeutic Exercise Time Entry: 15           General  Reason for Referral: L knee pain  Referred By: Dr. Candelario  Past Medical History Relevant to Rehab: Seizure disorder, STI, anxiety/depression    Current Problem  Problem List Items Addressed This Visit             ICD-10-CM    Chronic pain of left knee - Primary M25.562, G89.29    Relevant Orders    Follow Up In Physical Therapy    Muscle weakness of lower extremity M62.81    Relevant Orders    Follow Up In Physical Therapy      SUBJECTIVE  Subjective   Patient reports chronic L knee pain that began after a bad fall where he fell onto the L knee. He had no treatment then, but reports intermittent distal patellar knee pain with certain activities of increased intensity such as climbing stairs or prolonged standing/walking. This is leading to difficulty with functional activity tolerance and bowling due to being R handed and increased L LE WB during ball release.  Pain is reported to range 0-2/10 with daily activities and 6-8/10 with stair climbing, squatting, hiking, kneeling and 9-10/10 with rapid changes in atmospheric pressure when flying during takeoff and landing. Takes ibuprofen intermittently to help with pain and wears a knee compression sleeve brace 4-5 days/week.    Patient states that their goal is to decrease L knee pain and return to normal activities such as bowling with physical therapy intervention.    Prior level of function: Indep ADL's/IADL's, (+) working, (+) driving, (+) bowling pain-free    Patient's name and  were confirmed this date.    Precautions  Precautions  STEADI Fall Risk Score  "(The score of 4 or more indicates an increased risk of falling): 2  Precautions Comment: None noted     Pain  Pain Assessment: 0-10  0-10 (Numeric) Pain Score: 0 - No pain  Pain Type: Chronic pain  Pain Location: Knee  Pain Orientation: Left  Pain Interventions: Home medication    OBJECTIVE:    Lower Extremity Strength:  LE strength not listed below is WNL  MMT 5/5 max  LEFT RIGHT   Hip Flexion 4 4,4+   Hip Extension 4 4   Hip Abduction 4 4   Hip Adduction 5 5   Knee Extension 4-,4* 5   Knee Flexion 4+ 5   Ankle DF 4+ 5   Ankle PF 5 5   *denotes pain    Lower Extremity ROM:   LE ROM not listed below is WNL    Hip WFL  Knee AROM: Left 0-145 deg  Right 0-145 deg  Ankle: WFL    Joint mobility Increased mobility Lan knee joints    Gait mechanics: Pt amb w/o sig antalgia noted    Palpation Left: TTP distal patella ; R (-)    Special tests: Anterior drawer (+) L for mild-mod laxity but no pain; Mild laxity on R  Varus L (+) painful, R (-)  Valgus (-) Lan  Meniscus test (-) Lan    Outcome Measures:    Other Measures  Lower Extremity Funtional Score (LEFS): 60    TREATMENT:  EXERCISES Date 1/28/25 Date Date Date   VISIT # # 1 # # #   HEP Access Code:   [email or web address]                     Bike              Shuttle  DLP       Shuttle SLP       Shuttle HR              Qhip Flexion       Qhip Extension       Qhip Abduction       Qhip  TKE              Squat body mechanics                     ITB stretch       Gastroc stretch       Hamstring stretch 30\"x1 ea             Bridges 5\"H 1x10      Clamshells 1x10 ea      Reverse clamshells                                                   PATIENT EDUCATION:  HEP issued: Access Code: [email or web address]     ASSESSEMENT  Symptoms indicate L LE weakness, LE flexibility impairment, decreased functional endurance and chronic L knee pain that has been intermittent since a fall 2 years ago.    Recommend skilled physical therapy to address the above deficits that affect functional activities of daily living. "   Skilled intervention is needed to train and provide proper technique, educate patient on progression, risks, prognosis, and provide adequate feedback for technique correction and implementation.     Discharge planned upon achievement of goals or reaching maximum benefit from skilled physical therapy services.    Rehab potential: Excellent    PLAN       Pt. Is in agreement with PT plan.  Goals:  Active       L knee pain       1) Independent progressive home exercise program with 90% teach back capability by the patient       Start:  01/28/25    Expected End:  03/11/25            2) Pt to report L knee pain <5/10 with stair climbing and squatting to enable normal job performance        Start:  01/28/25    Expected End:  03/11/25            3) Improve MMT of Lan LE's to 5/5 or better to improve joint stability with squatting       Start:  01/28/25    Expected End:  03/11/25            4) Functional Outcome LEFS score improves to >70/80       Start:  01/28/25    Expected End:  03/11/25            5) Pt to perform >45 minutes of increased activities without difficulty or increased pain to return to normal recreational activities       Start:  01/28/25    Expected End:  03/11/25

## 2025-01-28 NOTE — LETTER
January 29, 2025    Elham Fisher, PT  [address] [facility]  [facility] [address]    Patient: Chano Diaz   YOB: 1990   Date of Visit: 1/28/2025       Dear Ambrose Candelario MD  [address] [facility]  [place] [address]    The attached plan of care is being sent to you because your patient’s medical reimbursement requires that you certify the plan of care. Your signature is required to allow uninterrupted insurance coverage.      You may indicate your approval by signing below and faxing this form back to us at Dept Fax: [phone].    Please call Dept: [phone] with any questions or concerns.    Thank you for this referral,        Elham Fisher, PT  [place] [id number] [facility] [id number] [phone]    Payer: Payor: [employer] / Plan: AMBETTER / Product Type: *No Product type* /                                                                         Date:     Dear Elham Fisher, PT,     Re: Mr. Chano Diaz, MRN:[id number]    I certify that I have reviewed the attached plan of care and it is medically necessary for Mr. Chano Diaz (1990) who is under my care.          ______________________________________                    _________________  Provider name and credentials                                           Date and time                                                                                           Plan of Care 1/28/25   Effective from: 1/28/2025  Effective to: 3/11/2025    Active  Plan ID: [id number]           Participants as of 1/29/2025    Name Type Comments Contact Info    Ambrose Candelario MD PCP - General  [phone]    Elham Fisher, PT Physical Therapist  [phone]      Last Plan Note     Author: Elham Fisher PT Status: Incomplete Last edited: 1/28/2025  3:15 PM       Physical Therapy    Physical Therapy Evaluation    Patient Name: Chano MEDELLIN  Diaz  MRN: [id number]  : 1990  Referring Physician: Ambrose Candelario  Today's Date: 2025  Time Calculation  Start Time: 1525  Stop Time: 1610  Time Calculation (min): 45 min  PT Evaluation Time Entry  PT Evaluation (Low) Time Entry: 30  PT Therapeutic Procedures Time Entry  Therapeutic Exercise Time Entry: 15           General  Reason for Referral: L knee pain  Referred By: Dr. Candelario  Past Medical History Relevant to Rehab: Seizure disorder, STI, anxiety/depression    Current Problem  Problem List Items Addressed This Visit             ICD-10-CM    Chronic pain of left knee - Primary M25.562, G89.29    Relevant Orders    Follow Up In Physical Therapy    Muscle weakness of lower extremity M62.81    Relevant Orders    Follow Up In Physical Therapy      SUBJECTIVE  Subjective  Patient reports chronic L knee pain that began after a bad fall where he fell onto the L knee. He had no treatment then, but reports intermittent distal patellar knee pain with certain activities of increased intensity such as climbing stairs or prolonged standing/walking. This is leading to difficulty with functional activity tolerance and bowling due to being R handed and increased L LE WB during ball release.  Pain is reported to range 0-2/10 with daily activities and 6-8/10 with stair climbing, squatting, hiking, kneeling and 9-10/10 with rapid changes in atmospheric pressure when flying during takeoff and landing. Takes ibuprofen intermittently to help with pain and wears a knee compression sleeve brace 4-5 days/week.    Patient states that their goal is to decrease L knee pain and return to normal activities such as bowling with physical therapy intervention.    Prior level of function: Indep ADL's/IADL's, (+) working, (+) driving, (+) bowling pain-free    Patient's name and  were confirmed this date.    Precautions  Precautions  STEADI Fall Risk Score (The score of 4 or more indicates an increased risk of falling):  "2  Precautions Comment: None noted     Pain  Pain Assessment: 0-10  0-10 (Numeric) Pain Score: 0 - No pain  Pain Type: Chronic pain  Pain Location: Knee  Pain Orientation: Left  Pain Interventions: Home medication    OBJECTIVE:    Lower Extremity Strength:  LE strength not listed below is WNL  MMT 5/5 max  LEFT RIGHT   Hip Flexion 4 4,4+   Hip Extension 4 4   Hip Abduction 4 4   Hip Adduction 5 5   Knee Extension 4-,4* 5   Knee Flexion 4+ 5   Ankle DF 4+ 5   Ankle PF 5 5   *denotes pain    Lower Extremity ROM:   LE ROM not listed below is WNL    Hip WFL  Knee AROM: Left 0-145 deg  Right 0-145 deg  Ankle: WFL    Joint mobility Increased mobility Lan knee joints    Gait mechanics: Pt amb w/o sig antalgia noted    Palpation Left: TTP distal patella ; R (-)    Special tests: Anterior drawer (+) L for mild-mod laxity but no pain; Mild laxity on R  Varus L (+) painful, R (-)  Valgus (-) Lan  Meniscus test (-) Lan    Outcome Measures:    Other Measures  Lower Extremity Funtional Score (LEFS): 60    TREATMENT:  EXERCISES Date 1/28/25 Date Date Date   VISIT # # 1 # # #   HEP Access Code:   [email or web address]                     Bike              Shuttle  DLP       Shuttle SLP       Shuttle HR              Qhip Flexion       Qhip Extension       Qhip Abduction       Qhip  TKE              Squat body mechanics                     ITB stretch       Gastroc stretch       Hamstring stretch 30\"x1 ea             Bridges 5\"H 1x10      Clamshells 1x10 ea      Reverse clamshells                                                   PATIENT EDUCATION:  HEP issued: Access Code: [email or web address]     ASSESSEMENT  Symptoms indicate L LE weakness, LE flexibility impairment, decreased functional endurance and chronic L knee pain that has been intermittent since a fall 2 years ago.    Recommend skilled physical therapy to address the above deficits that affect functional activities of daily living.   Skilled intervention is needed to train and provide proper " technique, educate patient on progression, risks, prognosis, and provide adequate feedback for technique correction and implementation.     Discharge planned upon achievement of goals or reaching maximum benefit from skilled physical therapy services.    Rehab potential: Excellent    PLAN       Pt. Is in agreement with PT plan.  Goals:  Active       L knee pain       1) Independent progressive home exercise program with 90% teach back capability by the patient       Start:  01/28/25    Expected End:  03/11/25            2) Pt to report L knee pain <5/10 with stair climbing and squatting to enable normal job performance        Start:  01/28/25    Expected End:  03/11/25            3) Improve MMT of Lan LE's to 5/5 or better to improve joint stability with squatting       Start:  01/28/25    Expected End:  03/11/25            4) Functional Outcome LEFS score improves to >70/80       Start:  01/28/25    Expected End:  03/11/25            5) Pt to perform >45 minutes of increased activities without difficulty or increased pain to return to normal recreational activities       Start:  01/28/25    Expected End:  03/11/25

## 2025-01-29 PROBLEM — M25.562 CHRONIC PAIN OF LEFT KNEE: Status: ACTIVE | Noted: 2025-01-29

## 2025-01-29 PROBLEM — M62.81 MUSCLE WEAKNESS OF LOWER EXTREMITY: Status: ACTIVE | Noted: 2025-01-29

## 2025-01-29 PROBLEM — G89.29 CHRONIC PAIN OF LEFT KNEE: Status: ACTIVE | Noted: 2025-01-29

## 2025-01-30 ENCOUNTER — TREATMENT (OUTPATIENT)
Dept: PHYSICAL THERAPY | Facility: HOSPITAL | Age: 35
End: 2025-01-30
Payer: COMMERCIAL

## 2025-01-30 DIAGNOSIS — M25.562 CHRONIC PAIN OF LEFT KNEE: ICD-10-CM

## 2025-01-30 DIAGNOSIS — G89.29 CHRONIC PAIN OF LEFT KNEE: ICD-10-CM

## 2025-01-30 DIAGNOSIS — M62.81 MUSCLE WEAKNESS OF LOWER EXTREMITY: ICD-10-CM

## 2025-01-30 PROCEDURE — 97140 MANUAL THERAPY 1/> REGIONS: CPT | Mod: GP,CQ

## 2025-01-30 ASSESSMENT — PAIN - FUNCTIONAL ASSESSMENT: PAIN_FUNCTIONAL_ASSESSMENT: 0-10

## 2025-01-30 ASSESSMENT — PAIN SCALES - GENERAL: PAINLEVEL_OUTOF10: 0 - NO PAIN

## 2025-01-30 NOTE — PROGRESS NOTES
"Physical Therapy    Physical Therapy Treatment    Patient Name: Chano Diaz  MRN: [id number]  : 1990  Today's Date: 2025  Time Calculation  Start Time: 401  Stop Time: 446  Time Calculation (min): 45 min    PT Therapeutic Procedures Time Entry  Manual Therapy Time Entry: 45          VISIT:# 2    Current Problem  Problem List Items Addressed This Visit             ICD-10-CM    Chronic pain of left knee M25.562, G89.29    Muscle weakness of lower extremity M62.81        Subjective     Pt reported he has been working in HEP daily without issues. No current pain.    Reason for Referral: L knee pain  Referred By: Dr. Candelario  Past Medical History Relevant to Rehab: Seizure disorder, STI, anxiety/depression  Precautions  Precautions  STEADI Fall Risk Score (The score of 4 or more indicates an increased risk of falling): 2  Precautions Comment: none noted       Pain  Pain Assessment: 0-10  0-10 (Numeric) Pain Score: 0 - No pain  Pain Type: Chronic pain  Pain Location: Knee  Pain Orientation: Left       Objective    Reviewed HEP  Initiated therapy           TREATMENT:  EXERCISES Date 25 Date 25 Date  Date   VISIT # # 1 # 2  # #   HEP Access Code:   [email or web address]                     Bike  8' L5   inc to 3/10             Shuttle  DLP  5B 2x10     Shuttle SLP  3b 2x10     Shuttle HR  4b 20x            Qhip Flexion  5# 2x10 norm     Qhip Extension       Qhip Abduction  5# 2x10 norm     Qhip  TKE              Squat body mechanics                     ITB stretch       Gastroc stretch  30\"x3     Hamstring stretch 30\"x1 ea 30\"x1 ea            Bridges 5\"H 1x10      Clamshells 1x10 ea 2x10 ea     Reverse clamshells                                                     Assessment:   Patient's knee pain increased to 3/10 on bike. Pt had intermittent pain with SLS L &R hips/medial & lateral knee with qhip but ended when exercise completed. Pt has good technique for clamshell exercise. Encouraged pt to complete hs stretch " one LE at a time for full benefit. Pt noted 1-3/10 intermittent pain in left knee dependant on positioning. Tightness in Right hip but decreased tightness in lan UES.       Plan:    Assess pt response to treatment. Progress as tolerated.    Goals:  Active       L knee pain       1) Independent progressive home exercise program with 90% teach back capability by the patient       Start:  01/28/25    Expected End:  03/11/25            2) Pt to report L knee pain <5/10 with stair climbing and squatting to enable normal job performance        Start:  01/28/25    Expected End:  03/11/25            3) Improve MMT of Lan LE's to 5/5 or better to improve joint stability with squatting       Start:  01/28/25    Expected End:  03/11/25            4) Functional Outcome LEFS score improves to >70/80       Start:  01/28/25    Expected End:  03/11/25            5) Pt to perform >45 minutes of increased activities without difficulty or increased pain to return to normal recreational activities       Start:  01/28/25    Expected End:  03/11/25

## 2025-01-31 DIAGNOSIS — F51.04 CHRONIC INSOMNIA: ICD-10-CM

## 2025-01-31 DIAGNOSIS — F41.9 ANXIETY AND DEPRESSION: ICD-10-CM

## 2025-01-31 DIAGNOSIS — F32.A ANXIETY AND DEPRESSION: ICD-10-CM

## 2025-01-31 RX ORDER — TRAZODONE HYDROCHLORIDE 50 MG/1
50 TABLET ORAL NIGHTLY PRN
Qty: 180 TABLET | Refills: 3 | Status: SHIPPED | OUTPATIENT
Start: 2025-01-31 | End: 2026-01-31

## 2025-02-03 ENCOUNTER — APPOINTMENT (OUTPATIENT)
Dept: PRIMARY CARE | Facility: CLINIC | Age: 35
End: 2025-02-03
Payer: COMMERCIAL

## 2025-02-03 DIAGNOSIS — Z23 NEED FOR INFLUENZA VACCINATION: Primary | ICD-10-CM

## 2025-02-03 PROCEDURE — 90656 IIV3 VACC NO PRSV 0.5 ML IM: CPT | Performed by: STUDENT IN AN ORGANIZED HEALTH CARE EDUCATION/TRAINING PROGRAM

## 2025-02-03 PROCEDURE — 90471 IMMUNIZATION ADMIN: CPT | Performed by: STUDENT IN AN ORGANIZED HEALTH CARE EDUCATION/TRAINING PROGRAM

## 2025-02-04 ENCOUNTER — TREATMENT (OUTPATIENT)
Dept: PHYSICAL THERAPY | Facility: HOSPITAL | Age: 35
End: 2025-02-04
Payer: COMMERCIAL

## 2025-02-04 DIAGNOSIS — M62.81 MUSCLE WEAKNESS OF LOWER EXTREMITY: ICD-10-CM

## 2025-02-04 DIAGNOSIS — G89.29 CHRONIC PAIN OF LEFT KNEE: ICD-10-CM

## 2025-02-04 DIAGNOSIS — M54.2 NECK PAIN, MUSCULOSKELETAL: ICD-10-CM

## 2025-02-04 DIAGNOSIS — M25.562 CHRONIC PAIN OF LEFT KNEE: ICD-10-CM

## 2025-02-04 PROCEDURE — 97110 THERAPEUTIC EXERCISES: CPT | Mod: GP,CQ

## 2025-02-04 ASSESSMENT — PAIN - FUNCTIONAL ASSESSMENT: PAIN_FUNCTIONAL_ASSESSMENT: 0-10

## 2025-02-04 ASSESSMENT — PAIN SCALES - GENERAL: PAINLEVEL_OUTOF10: 2

## 2025-02-04 NOTE — PROGRESS NOTES
"Physical Therapy    Physical Therapy Treatment    Patient Name: Chano Diaz  MRN: [id number]  : 1990  Today's Date: 2025  Time Calculation  Start Time: 1530  Stop Time: 1615  Time Calculation (min): 45 min    PT Therapeutic Procedures Time Entry  Therapeutic Exercise Time Entry: 45          VISIT:# 3    Current Problem  Problem List Items Addressed This Visit             ICD-10-CM       Musculoskeletal and Injuries    Chronic pain of left knee M25.562, G89.29    Muscle weakness of lower extremity M62.81        Subjective   Reason for Referral: L knee pain  Referred By: Dr. Candelario  Past Medical History Relevant to Rehab: Seizure disorder, STI, anxiety/depression     Name and  confirmed at the beginning of the session. He reports some slight pain around the superior and inferior aspects of the L patella. He has had no recent falls and has been complaint with his HEP.  Pain  Pain Assessment: 0-10  0-10 (Numeric) Pain Score: 2  Pain Type: Chronic pain  Pain Location: Knee  Pain Orientation: Left       Objective    Added forward and lateral step ups and mini squats             Precautions  Precautions  STEADI Fall Risk Score (The score of 4 or more indicates an increased risk of falling): 2  Precautions Comment: none noted      Treatments:     EXERCISES Date 25 Date 25 Date 2025  Date   VISIT # # 1 # 2  #3 #   HEP Access Code:   [email or web address]                     Bike  8' L5   inc to 3/10  8' L6            Shuttle  DLP  5B 2x10 5B 2x10    Shuttle SLP  3b 2x10 3B 2x10    Shuttle HR  4b 20x 4B 2x10           Qhip Flexion  5# 2x10 lan 5# 2x10 Lan    Qhip Extension       Qhip Abduction  5# 2x10 lan 5# 2x10 Lan    Qhip  TKE              Squat body mechanics   Educated on proper mechanics and performed mini squats; 2 sets 10 reps                  ITB stretch       Gastroc stretch  30\"x3 30\"x3    Hamstring stretch 30\"x1 ea 30\"x1 ea 30\"x1 ea           Bridges 5\"H 1x10      Clamshells 1x10 ea 2x10 ea   "   Reverse clamshells              Step Ups FWD/LAT   2x10 Lan both dir                                                      Assessment:   At the end of the session pt reports pain around a 0-1/10. He tolerated session well and was able to complete exercises with minimal difficulty. Pt did not have any increased pain while using the bike today and did not have any intermitted pain throughout treatment.        Plan:    Assess pt response to treatment. Progress as tolerated.     Goals:  Active       L knee pain       1) Independent progressive home exercise program with 90% teach back capability by the patient       Start:  01/28/25    Expected End:  03/11/25            2) Pt to report L knee pain <5/10 with stair climbing and squatting to enable normal job performance        Start:  01/28/25    Expected End:  03/11/25            3) Improve MMT of Lan LE's to 5/5 or better to improve joint stability with squatting       Start:  01/28/25    Expected End:  03/11/25            4) Functional Outcome LEFS score improves to >70/80       Start:  01/28/25    Expected End:  03/11/25            5) Pt to perform >45 minutes of increased activities without difficulty or increased pain to return to normal recreational activities       Start:  01/28/25    Expected End:  03/11/25

## 2025-02-05 RX ORDER — TIZANIDINE 2 MG/1
2 TABLET ORAL NIGHTLY PRN
Qty: 90 TABLET | Refills: 0 | Status: SHIPPED | OUTPATIENT
Start: 2025-02-05 | End: 2025-04-06

## 2025-02-06 ENCOUNTER — TREATMENT (OUTPATIENT)
Dept: PHYSICAL THERAPY | Facility: HOSPITAL | Age: 35
End: 2025-02-06
Payer: COMMERCIAL

## 2025-02-06 DIAGNOSIS — M62.81 MUSCLE WEAKNESS OF LOWER EXTREMITY: ICD-10-CM

## 2025-02-06 DIAGNOSIS — G89.29 CHRONIC PAIN OF LEFT KNEE: Primary | ICD-10-CM

## 2025-02-06 DIAGNOSIS — M25.562 CHRONIC PAIN OF LEFT KNEE: Primary | ICD-10-CM

## 2025-02-06 PROCEDURE — 97110 THERAPEUTIC EXERCISES: CPT | Mod: GP,CQ

## 2025-02-06 ASSESSMENT — PAIN - FUNCTIONAL ASSESSMENT: PAIN_FUNCTIONAL_ASSESSMENT: 0-10

## 2025-02-06 ASSESSMENT — PAIN SCALES - GENERAL: PAINLEVEL_OUTOF10: 0 - NO PAIN

## 2025-02-06 NOTE — PROGRESS NOTES
"Physical Therapy    Physical Therapy Treatment    Patient Name: Chano Diaz  MRN: [id number]  : 1990  Today's Date: 2025  Time Calculation  Start Time: 518  Stop Time: 608  Time Calculation (min): 50 min    PT Therapeutic Procedures Time Entry  Therapeutic Exercise Time Entry: 50          VISIT:# 4    Current Problem  Problem List Items Addressed This Visit             ICD-10-CM    Chronic pain of left knee - Primary M25.562, G89.29    Muscle weakness of lower extremity M62.81        Subjective   Reason for Referral: L knee pain  Referred By: Dr. Candelario  Past Medical History Relevant to Rehab: Seizure disorder, STI, anxiety/depression     Pt notices some activities are getting a little easier with less pain, noted walking.     Pain  Pain Assessment: 0-10  0-10 (Numeric) Pain Score: 0 - No pain  Pain Type: Chronic pain  Pain Location: Knee  Pain Orientation: Left       Objective    Added qhip ext and TKE         Precautions  Precautions  STEADI Fall Risk Score (The score of 4 or more indicates an increased risk of falling): 2  Precautions Comment: none noted    Treatments:     EXERCISES Date 25 Date 25 Date 25  Date 24   VISIT # # 1 # 2  #3 #4   HEP Access Code:   [email or web address]                     Bike  8' L5   inc to 3/10  8' L6  10' L6   [phone]       Shuttle  DLP  5B 2x10 5B 2x10 6b 2x10   Shuttle SLP  3b 2x10 3B 2x10 4b 2x10   Shuttle HR  4b 20x 4B 2x10 4b 2x10          Qhip Flexion  5# 2x10 lan 5# 2x10 Lan 5# 2x10 Lan   Qhip Extension    5# 2x10 Lan   Qhip Abduction  5# 2x10 lan 5# 2x10 Lan 5# 2x10 Lan   Qhip  TKE    15# 1x10 left          Squat body mechanics   Educated on proper mechanics and performed mini squats; 2 sets 10 reps 2x10                 ITB stretch       Gastroc stretch  30\"x3 30\"x3 30\"x3   Hamstring stretch 30\"x1 ea 30\"x1 ea 30\"x1 ea           Bridges 5\"H 1x10      Clamshells 1x10 ea 2x10 ea     Reverse clamshells              Step Ups FWD/LAT   2x10 Lan both dir 2x10 " Lan both dir                                                     Assessment:   Pt able to complete new qhip exercises without increasing pain. Pt had some increased pain with mini squats, pt was compensating on the RLE, pain increased when he distributed weight evenly through BLE to 2/10. Pt challenged with SLP on shuttle. No change in pain at end of session.       Plan:   Progress as tolerated.     Goals:  Active       L knee pain       1) Independent progressive home exercise program with 90% teach back capability by the patient       Start:  01/28/25    Expected End:  03/11/25            2) Pt to report L knee pain <5/10 with stair climbing and squatting to enable normal job performance        Start:  01/28/25    Expected End:  03/11/25            3) Improve MMT of Lan LE's to 5/5 or better to improve joint stability with squatting       Start:  01/28/25    Expected End:  03/11/25            4) Functional Outcome LEFS score improves to >70/80       Start:  01/28/25    Expected End:  03/11/25            5) Pt to perform >45 minutes of increased activities without difficulty or increased pain to return to normal recreational activities       Start:  01/28/25    Expected End:  03/11/25

## 2025-02-11 ENCOUNTER — TREATMENT (OUTPATIENT)
Dept: PHYSICAL THERAPY | Facility: HOSPITAL | Age: 35
End: 2025-02-11
Payer: COMMERCIAL

## 2025-02-11 DIAGNOSIS — G89.29 CHRONIC PAIN OF LEFT KNEE: ICD-10-CM

## 2025-02-11 DIAGNOSIS — M62.81 MUSCLE WEAKNESS OF LOWER EXTREMITY: ICD-10-CM

## 2025-02-11 DIAGNOSIS — M25.562 CHRONIC PAIN OF LEFT KNEE: ICD-10-CM

## 2025-02-11 PROCEDURE — 97110 THERAPEUTIC EXERCISES: CPT | Mod: GP,CQ

## 2025-02-11 NOTE — PROGRESS NOTES
"Physical Therapy    Physical Therapy Treatment    Patient Name: Chano Diaz  MRN: [id number]  : 1990   Today's Date: 2025  Time Calculation  Start Time: 0300  Stop Time: 345  Time Calculation (min): 45 min  Visit #5    PT Therapeutic Procedures Time Entry  Therapeutic Exercise Time Entry: 45          Current Problem  1. Chronic pain of left knee  Follow Up In Physical Therapy      2. Muscle weakness of lower extremity  Follow Up In Physical Therapy            Subjective   Pt states he feels good with no pain.        Precautions   STEADI Fall Risk Score (The score of 4 or more indicates an increased risk of falling): 2  Precautions Comment: none noted       Pain   0/10    Objective    Increased reps See flow sheet       Treatments:  EXERCISES Date 25 Date 25  Date 24   VISIT # # 2  #3 #4 #5   HEP                     Bike 8' L5   inc to 3/10  8' L6  10' L6 10' L6          Shuttle  DLP 5B 2x10 5B 2x10 6b 2x10 6b 2x10   Shuttle SLP 3b 2x10 3B 2x10 4b 2x10 4b 2x10   Shuttle HR 4b 20x 4B 2x10 4b 2x10 4b 2x10          Qhip Flexion 5# 2x10 lan 5# 2x10 Lan 5# 2x10 Lan 5# 2x10 Lan   Qhip Extension   5# 2x10 Lan 5# 2x10 Lan   Qhip Abduction 5# 2x10 lan 5# 2x10 Lan 5# 2x10 Lan 5# 2x10 Lan   Qhip  TKE   15# 1x10 left 15# 2x10 left          Squat body mechanics  Educated on proper mechanics and performed mini squats; 2 sets 10 reps 2x10 2x10                  ITB stretch       Gastroc stretch 30\"x3 30\"x3 30\"x3 30\"x3   Hamstring stretch 30\"x1 ea 30\"x1 ea            Bridges       Clamshells 2x10 ea      Reverse clamshells              Step Ups FWD/LAT  2x10 Lan both dir 2x10 Lan both dir 6 in 2x10 Lan both dir                                 Assessment:  Pt tolerated all exercises with no increased pain. Increased height of step and reps for TKE. Pt reports squats were a good challenge.        Plan:   Progress as tolerated.     OP EDUCATION:       Goals:  Active       L knee pain       1) " Independent progressive home exercise program with 90% teach back capability by the patient       Start:  01/28/25    Expected End:  03/11/25            2) Pt to report L knee pain <5/10 with stair climbing and squatting to enable normal job performance        Start:  01/28/25    Expected End:  03/11/25            3) Improve MMT of Lan LE's to 5/5 or better to improve joint stability with squatting       Start:  01/28/25    Expected End:  03/11/25            4) Functional Outcome LEFS score improves to >70/80       Start:  01/28/25    Expected End:  03/11/25            5) Pt to perform >45 minutes of increased activities without difficulty or increased pain to return to normal recreational activities       Start:  01/28/25    Expected End:  03/11/25

## 2025-02-12 ENCOUNTER — APPOINTMENT (OUTPATIENT)
Facility: CLINIC | Age: 35
End: 2025-02-12
Payer: COMMERCIAL

## 2025-02-12 VITALS
HEART RATE: 77 BPM | HEIGHT: 68 IN | SYSTOLIC BLOOD PRESSURE: 117 MMHG | DIASTOLIC BLOOD PRESSURE: 78 MMHG | BODY MASS INDEX: 22.43 KG/M2 | WEIGHT: 148 LBS

## 2025-02-12 DIAGNOSIS — R68.89 ABNORMAL WEIGHT: Primary | ICD-10-CM

## 2025-02-12 DIAGNOSIS — K52.9 CHRONIC DIARRHEA OF UNKNOWN ORIGIN: ICD-10-CM

## 2025-02-12 PROCEDURE — 4274F FLU IMMUNO ADMIND RCVD: CPT | Performed by: INTERNAL MEDICINE

## 2025-02-12 PROCEDURE — 3008F BODY MASS INDEX DOCD: CPT | Performed by: INTERNAL MEDICINE

## 2025-02-12 PROCEDURE — 99204 OFFICE O/P NEW MOD 45 MIN: CPT | Performed by: INTERNAL MEDICINE

## 2025-02-12 NOTE — ASSESSMENT & PLAN NOTE
We will need to exclude acute causes of diarrhea as well as causes of chronic diarrhea.  Colonoscopy pending these results.  Suggested twice daily Imodium for symptomatic relief and we will titrate this to effect.  Office follow-up in 3 to 4 weeks.  The patient was instructed to contact me should he develop new or worsening symptoms.  Orders:    Referral to Gastroenterology    Calprotectin, Fecal; Future    C-Reactive Protein; Future    Stool Pathogen Panel, PCR; Future    Strongyloides IgG Antibodies; Future    Pancreatic Elastase, Fecal; Future    C. difficile, PCR; Future    Tissue Transglutaminase IgA; Future    Deamidated Gliadin Antibody IgA; Future    Sedimentation Rate; Future    Referral to Nutrition Services; Future

## 2025-02-12 NOTE — PROGRESS NOTES
[facility]    ASSESSMENT and PLAN:            Assessment & Plan  Chronic diarrhea of unknown origin  We will need to exclude acute causes of diarrhea as well as causes of chronic diarrhea.  Colonoscopy pending these results.  Suggested twice daily Imodium for symptomatic relief and we will titrate this to effect.  Office follow-up in 3 to 4 weeks.  The patient was instructed to contact me should he develop new or worsening symptoms.  Orders:    Referral to Gastroenterology    Calprotectin, Fecal; Future    C-Reactive Protein; Future    Stool Pathogen Panel, PCR; Future    Strongyloides IgG Antibodies; Future    Pancreatic Elastase, Fecal; Future    C. difficile, PCR; Future    Tissue Transglutaminase IgA; Future    Deamidated Gliadin Antibody IgA; Future    Sedimentation Rate; Future    Referral to Nutrition Services; Future    Abnormal weight  Inability to gain weight despite caloric intake.  Orders:    Calprotectin, Fecal; Future    C-Reactive Protein; Future    Stool Pathogen Panel, PCR; Future    Strongyloides IgG Antibodies; Future    Pancreatic Elastase, Fecal; Future    C. difficile, PCR; Future    Tissue Transglutaminase IgA; Future    Deamidated Gliadin Antibody IgA; Future    Sedimentation Rate; Future    Referral to Nutrition Services; Future           Nas Winter MD    Gastroenterology  [facility]            Subjective   HISTORY OF PRESENT ILLNESS:     Chief Complaint  New Patient Visit (Chronic diarrhea off and on for 6 months/Flex sig 8/26/16)    History Of Present Illness:    Chano Diaz is a 34 y.o. male with a significant past medical history of HIV who presents for consultation requested by his primary care provider (Ambrose Candelario MD) for the evaluation of diarrhea.  Diarrhea began approximately 6 months ago without an obvious insult or precipitant.  Did start on a regimen of antidepressants but unclear if this  started before the onset of loose stools.  May have 5-10 loose stools per day.  These are nonbloody.  No heartburn/GERD, N/V, dysphagia, odynophagia, abdominal pain,  constipation, hematemesis, hematochezia, melena, or weight loss.  No fevers chills or rashes.  Does have difficulty gaining weight.    Endoscopy History:    2016 sigmoidoscopy by colorectal surgery revealing anal canal dysplasia.    Review of systems:   Review of Systems    I performed a complete 10 point review of systems and it is negative except as noted in HPI or above.      PAST HISTORIES:       Past Medical History:  Patient Active Problem List   Diagnosis    Seizure disorder (Multi)    Anxiety and depression    Alcohol abuse    Anxiety    Dysuria    HIV (human immunodeficiency virus infection)    Palpable lymph node    Epilepsy    Chronic pain of left knee    Muscle weakness of lower extremity    Chronic diarrhea of unknown origin     He has a past medical history of Anxiety (11/15/2024), Depression (11/15/2024), HIV disease (Multi) (10/2024), Personal history of other specified conditions (03/10/2021), and Seizures (Multi) (8/2019).    He has no past medical history of Personal history of irradiation.    Past Surgical History:  He has a past surgical history that includes Flexible sigmoidoscopy; Colon surgery (2016); and Circumcision, primary (03/1991).      Social History:  He reports that he has been smoking cigarettes. He started smoking about 15 years ago. He has a 15.1 pack-year smoking history. He has never used smokeless tobacco. He reports that he does not currently use alcohol. He reports that he does not currently use drugs after having used the following drugs: Marijuana.    Family History:  No known family history of GI disease, specifically denies any family history of pancreatitis, Crohn's, colon cancer, gastroesophageal cancer, or ulcerative colitis.    No family history on file.     Allergies:  Patient has no known  "allergies.      Objective   OBJECTIVE:       Last Recorded Vitals:  Vitals:    02/12/25 1517   BP: 117/78   Pulse: 77   Weight: 67.1 kg (148 lb)   Height: 1.727 m (5' 8\")     /78   Pulse 77   Ht 1.727 m (5' 8\")   Wt 67.1 kg (148 lb)   BMI 22.50 kg/m²      Physical Exam:    Physical Exam  Constitutional:       General: He is awake.      Appearance: Normal appearance.   HENT:      Head: Normocephalic and atraumatic.      Nose: Nose normal.      Mouth/Throat:      Mouth: Mucous membranes are moist.   Eyes:      Extraocular Movements: Extraocular movements intact.      Conjunctiva/sclera: Conjunctivae normal.   Neck:      Thyroid: No thyroid mass.      Trachea: Phonation normal.   Cardiovascular:      Rate and Rhythm: Normal rate and regular rhythm.      Heart sounds: Normal heart sounds. No murmur heard.     No gallop.   Pulmonary:      Effort: Pulmonary effort is normal. No respiratory distress.      Breath sounds: Normal air entry. No decreased breath sounds, wheezing, rhonchi or rales.   Abdominal:      General: Bowel sounds are normal. There is no distension.      Palpations: Abdomen is soft.      Tenderness: There is no abdominal tenderness.   Musculoskeletal:         General: Normal range of motion.      Cervical back: Neck supple.      Right lower leg: No edema.      Left lower leg: No edema.   Skin:     General: Skin is warm and dry.      Capillary Refill: Capillary refill takes less than 2 seconds.      Coloration: Skin is not jaundiced.   Neurological:      Mental Status: He is alert and oriented to person, place, and time. Mental status is at baseline.      Cranial Nerves: Cranial nerves 2-12 are intact.      Motor: Motor function is intact.   Psychiatric:         Attention and Perception: Attention and perception normal.         Mood and Affect: Mood normal.         Speech: Speech normal.         Behavior: Behavior normal.         Home Medications:  Prior to Admission medications    Medication Sig " Start Date End Date Taking? Authorizing Provider   ascorbic acid (Vitamin C) 1,000 mg tablet Take 1 tablet (1,000 mg) by mouth once daily.    Historical Provider, MD   B complex-vitamin C-folic acid (Nephro-Dami Rx) 1- mg-mg-mcg tablet Take 1 tablet by mouth once daily with breakfast.    Historical Provider, MD   busPIRone (Buspar) 10 mg tablet Take 1 tablet (10 mg) by mouth 3 times a day. 1/6/25 1/6/26  CHELLY Camargo   cetirizine (ZyrTEC) 10 mg chewable tablet Chew once daily. Alternates with Claritin    Historical Provider, MD   cholecalciferol (Vitamin D-3) 25 MCG (1000 UT) capsule Take by mouth once daily.    Historical Provider, MD   CREATINE BULK, MISC     Historical Provider, MD   emtricitab/rilpiviri/tenof ala (ODEFSEY ORAL) Take by mouth.    Historical Provider, MD   levETIRAcetam (Keppra) 750 mg tablet Take 1 tablet (750 mg) by mouth 2 times a day. 11/19/24 11/19/25  CHELLY Camargo   loratadine (Claritin Reditabs) 10 mg disintegrating tablet Dissolve 1 tablet (10 mg) in the mouth once daily. Alternates with zyrtec    Historical Provider, MD   nasal spray midazolam (Nayzilam) 5 mg/spray (0.1 mL) spray,non-aerosol Administer 1 spray into affected nostril(s) if needed (for seizure, may repeat dose in 10 minutes to other nostril if needed). 11/19/24   CHELLY Camargo   sertraline (Zoloft) 25 mg tablet Take 1 tablet (25 mg) by mouth once daily. 1/6/25 1/6/26  CHELLY Camargo   tiZANidine (Zanaflex) 2 mg tablet TAKE 1 TABLET (2 MG) BY MOUTH AS NEEDED AT BEDTIME FOR MUSCLE SPASMS. 2/5/25 4/6/25  Ambrose Candelario MD   traZODone (Desyrel) 50 mg tablet Take 1 tablet (50 mg) by mouth as needed at bedtime for sleep (take 1-2 tablets PRN for sleep). 1/31/25 1/31/26  Ne Alba, APRN-CNP   zinc gluconate 50 mg tablet Take by mouth once daily.    Historical Provider, MD   melatonin 10 mg tablet, sublingual Place under the tongue. Takes 5-10 mg prn hs  2/12/25   Historical Provider, MD         Relevant Results Recent labs reviewed in the EMR.    Lab Results   Component Value Date/Time    WBC 5.4 11/19/2024 1034    HGB 15.7 11/19/2024 1034    HGB 15.3 03/22/2020 1741    HGB 15.5 03/11/2020 1307     11/19/2024 1034     11/19/2024 1034     03/22/2020 1741     03/11/2020 1307       Lab Results   Component Value Date/Time     11/19/2024 1034    K 3.7 11/19/2024 1034     11/19/2024 1034    BUN 15 11/19/2024 1034    CREATININE 0.87 11/19/2024 1034    CREATININE 0.86 03/22/2020 1741    CREATININE 0.78 03/11/2020 1307       Lab Results   Component Value Date/Time    BILITOT 0.7 11/19/2024 1034    BILITOT 0.3 03/22/2020 1741    BILITOT 0.3 03/11/2020 1307    ALKPHOS 84 11/19/2024 1034    ALKPHOS 58 03/22/2020 1741    ALKPHOS 51 03/11/2020 1307    AST 56 (H) 11/19/2024 1034    AST 13 03/22/2020 1741    AST 19 03/11/2020 1307    ALT 43 11/19/2024 1034    ALT 7 (L) 03/22/2020 1741    ALT 20 03/11/2020 1307         Radiology: Imaging reviewed in the EMR.  No results found.

## 2025-02-13 ENCOUNTER — TREATMENT (OUTPATIENT)
Dept: PHYSICAL THERAPY | Facility: HOSPITAL | Age: 35
End: 2025-02-13
Payer: COMMERCIAL

## 2025-02-13 DIAGNOSIS — M25.562 CHRONIC PAIN OF LEFT KNEE: ICD-10-CM

## 2025-02-13 DIAGNOSIS — G89.29 CHRONIC PAIN OF LEFT KNEE: ICD-10-CM

## 2025-02-13 DIAGNOSIS — M62.81 MUSCLE WEAKNESS OF LOWER EXTREMITY: ICD-10-CM

## 2025-02-13 PROCEDURE — 97110 THERAPEUTIC EXERCISES: CPT | Mod: GP,CQ

## 2025-02-13 NOTE — PROGRESS NOTES
"Physical Therapy    Physical Therapy Treatment    Patient Name: Chano Diaz  MRN: [id number]  : 1990   Today's Date: 2025  Time Calculation  Start Time: 0515  Stop Time: 0600  Time Calculation (min): 45 min  Visit #6    PT Therapeutic Procedures Time Entry  Therapeutic Exercise Time Entry: 45          Current Problem  1. Chronic pain of left knee  Follow Up In Physical Therapy      2. Muscle weakness of lower extremity  Follow Up In Physical Therapy            Subjective   Pt reports no knee pain. Pt states since last visit he had pain in the ball of his foot RLE.        Precautions    STEADI Fall Risk Score (The score of 4 or more indicates an increased risk of falling): 2  Precautions Comment: none noted       Pain   2/10 on RLE the inside ball of his foot. 0/10 knee pain.     Objective    Increased resistance.       Treatments:  EXERCISES Date 25  Date 24   VISIT # #3 #4 #5 #6   HEP                     Bike 8' L6  10' L6 10' L6 10' L6          Shuttle  DLP 5B 2x10 6b 2x10 6b 2x10 6b 3x10   Shuttle SLP 3B 2x10 4b 2x10 4b 2x10 4b 3x10   Shuttle HR 4B 2x10 4b 2x10 4b 2x10 4b 3x10          Qhip Flexion 5# 2x10 Lan 5# 2x10 Lan 5# 2x10 Lan 10# 2x10 Lan   Qhip Extension  5# 2x10 Lan 5# 2x10 Lan 10# 2x10 Lan   Qhip Abduction 5# 2x10 Lan 5# 2x10 Lan 5# 2x10 Lan 10# 2x10 Lan   Qhip  TKE  15# 1x10 left 15# 2x10 left 20# 2x10 left          Squat body mechanics Educated on proper mechanics and performed mini squats; 2 sets 10 reps 2x10 2x10  2x10                  ITB stretch       Gastroc stretch 30\"x3 30\"x3 30\"x3 30\"x3   Hamstring stretch 30\"x1 ea             Bridges       Clamshells       Reverse clamshells              Step Ups FWD/LAT 2x10 Lan both dir 2x10 Lan both dir 6 in 2x10 Lan both dir 6 in 2x10 Lan both dir                                 Assessment:  Pt reports knee pain was 1/10 with exercises, and no pain in ball of foot with exercises. Pt able to complete exercises with a " good challenge.        Plan:   Cont with stretching and strengthening to decrease knee pain.     OP EDUCATION:       Goals:  Active       L knee pain       1) Independent progressive home exercise program with 90% teach back capability by the patient       Start:  01/28/25    Expected End:  03/11/25            2) Pt to report L knee pain <5/10 with stair climbing and squatting to enable normal job performance        Start:  01/28/25    Expected End:  03/11/25            3) Improve MMT of Lan LE's to 5/5 or better to improve joint stability with squatting       Start:  01/28/25    Expected End:  03/11/25            4) Functional Outcome LEFS score improves to >70/80       Start:  01/28/25    Expected End:  03/11/25            5) Pt to perform >45 minutes of increased activities without difficulty or increased pain to return to normal recreational activities       Start:  01/28/25    Expected End:  03/11/25

## 2025-02-14 LAB
CRP SERPL-MCNC: <3 MG/L
ERYTHROCYTE [SEDIMENTATION RATE] IN BLOOD BY WESTERGREN METHOD: 2 MM/H
GLIADIN IGA SER IA-ACNC: <1 U/ML
S STERCORALIS IGG SER QL IA: NORMAL
TTG IGA SER-ACNC: <1 U/ML

## 2025-02-18 ENCOUNTER — TREATMENT (OUTPATIENT)
Dept: PHYSICAL THERAPY | Facility: HOSPITAL | Age: 35
End: 2025-02-18
Payer: COMMERCIAL

## 2025-02-18 DIAGNOSIS — M62.81 MUSCLE WEAKNESS OF LOWER EXTREMITY: ICD-10-CM

## 2025-02-18 DIAGNOSIS — M25.562 CHRONIC PAIN OF LEFT KNEE: ICD-10-CM

## 2025-02-18 DIAGNOSIS — G89.29 CHRONIC PAIN OF LEFT KNEE: ICD-10-CM

## 2025-02-18 LAB
CRP SERPL-MCNC: <3 MG/L
ERYTHROCYTE [SEDIMENTATION RATE] IN BLOOD BY WESTERGREN METHOD: 2 MM/H
GLIADIN IGA SER IA-ACNC: <1 U/ML
S STERCORALIS IGG SER QL IA: NEGATIVE
TTG IGA SER-ACNC: <1 U/ML

## 2025-02-18 PROCEDURE — 97110 THERAPEUTIC EXERCISES: CPT | Mod: GP,CQ

## 2025-02-18 NOTE — PROGRESS NOTES
"Physical Therapy    Physical Therapy Treatment    Patient Name: Chano Diaz  MRN: [id number]  : 1990   Today's Date: 2025  Time Calculation  Start Time: 345  Stop Time: 430  Time Calculation (min): 45 min  Visit #7    PT Therapeutic Procedures Time Entry  Therapeutic Exercise Time Entry: 45          Current Problem  1. Chronic pain of left knee  Follow Up In Physical Therapy      2. Muscle weakness of lower extremity  Follow Up In Physical Therapy            Subjective   Pt states he feels good with no pain.        Precautions     STEADI Fall Risk Score (The score of 4 or more indicates an increased risk of falling): 2  Precautions Comment: none noted       Pain   0/10    Objective    Increased step height      Treatments:  EXERCISES Date 25  Date 24   VISIT # #3 #4 #5 #6 #7   HEP                        Bike 8' L6  10' L6 10' L6 10' L6 10' L6           Shuttle  DLP 5B 2x10 6b 2x10 6b 2x10 6b 3x10 6b 3x10   Shuttle SLP 3B 2x10 4b 2x10 4b 2x10 4b 3x10 4b 3x10   Shuttle HR 4B 2x10 4b 2x10 4b 2x10 4b 3x10 4b 3x10           Qhip Flexion 5# 2x10 Lan 5# 2x10 Lan 5# 2x10 Lan 10# 2x10 Lan 10# 2x10 Lan   Qhip Extension  5# 2x10 Lan 5# 2x10 Lan 10# 2x10 Lan 10# 2x10 Lan   Qhip Abduction 5# 2x10 Lan 5# 2x10 Lan 5# 2x10 Lan 10# 2x10 Lan 10# 2x10 Lan   Qhip  TKE  15# 1x10 left 15# 2x10 left 20# 2x10 left 20# 2x10 left           Squat body mechanics Educated on proper mechanics and performed mini squats; 2 sets 10 reps 2x10 2x10  2x10  2x10                    ITB stretch        Gastroc stretch 30\"x3 30\"x3 30\"x3 30\"x3 30\"x3   Hamstring stretch 30\"x1 ea               Bridges        Clamshells        Reverse clamshells                Step Ups FWD/LAT 2x10 Lan both dir 2x10 Lan both dir 6 in 2x10 Lan both dir 6 in 2x10 Lan both dir 8 in 2x10 Lan both dir                                     Assessment:  Pt tolerated all exercises with no increased pain. Pt states he has been feeling better " since starting PT.        Plan:    Cont with stretching and strengthening to decrease knee pain.     OP EDUCATION:       Goals:  Active       L knee pain       1) Independent progressive home exercise program with 90% teach back capability by the patient       Start:  01/28/25    Expected End:  03/11/25            2) Pt to report L knee pain <5/10 with stair climbing and squatting to enable normal job performance        Start:  01/28/25    Expected End:  03/11/25            3) Improve MMT of Lan LE's to 5/5 or better to improve joint stability with squatting       Start:  01/28/25    Expected End:  03/11/25            4) Functional Outcome LEFS score improves to >70/80       Start:  01/28/25    Expected End:  03/11/25            5) Pt to perform >45 minutes of increased activities without difficulty or increased pain to return to normal recreational activities       Start:  01/28/25    Expected End:  03/11/25

## 2025-02-19 ENCOUNTER — OFFICE VISIT (OUTPATIENT)
Dept: NEUROLOGY | Facility: HOSPITAL | Age: 35
End: 2025-02-19
Payer: COMMERCIAL

## 2025-02-19 VITALS
DIASTOLIC BLOOD PRESSURE: 77 MMHG | SYSTOLIC BLOOD PRESSURE: 128 MMHG | HEART RATE: 97 BPM | WEIGHT: 149.4 LBS | BODY MASS INDEX: 22.72 KG/M2

## 2025-02-19 DIAGNOSIS — F32.A ANXIETY AND DEPRESSION: ICD-10-CM

## 2025-02-19 DIAGNOSIS — F51.04 CHRONIC INSOMNIA: ICD-10-CM

## 2025-02-19 DIAGNOSIS — G40.909 SEIZURE DISORDER (MULTI): ICD-10-CM

## 2025-02-19 DIAGNOSIS — F10.11 ALCOHOL ABUSE, IN REMISSION: Primary | ICD-10-CM

## 2025-02-19 DIAGNOSIS — F41.9 ANXIETY AND DEPRESSION: ICD-10-CM

## 2025-02-19 PROCEDURE — 4274F FLU IMMUNO ADMIND RCVD: CPT | Performed by: NURSE PRACTITIONER

## 2025-02-19 PROCEDURE — 99214 OFFICE O/P EST MOD 30 MIN: CPT | Performed by: NURSE PRACTITIONER

## 2025-02-19 RX ORDER — BUSPIRONE HYDROCHLORIDE 15 MG/1
15 TABLET ORAL 3 TIMES DAILY
Qty: 270 TABLET | Refills: 3 | Status: SHIPPED | OUTPATIENT
Start: 2025-02-19 | End: 2026-02-19

## 2025-02-19 ASSESSMENT — ENCOUNTER SYMPTOMS
DEPRESSION: 1
LOSS OF SENSATION IN FEET: 1
OCCASIONAL FEELINGS OF UNSTEADINESS: 0

## 2025-02-19 ASSESSMENT — PATIENT HEALTH QUESTIONNAIRE - PHQ9
SUM OF ALL RESPONSES TO PHQ QUESTIONS 1-9: 16
7. TROUBLE CONCENTRATING ON THINGS, SUCH AS READING THE NEWSPAPER OR WATCHING TELEVISION: NEARLY EVERY DAY
SUM OF ALL RESPONSES TO PHQ9 QUESTIONS 1 AND 2: 4
2. FEELING DOWN, DEPRESSED OR HOPELESS: MORE THAN HALF THE DAYS
8. MOVING OR SPEAKING SO SLOWLY THAT OTHER PEOPLE COULD HAVE NOTICED. OR THE OPPOSITE, BEING SO FIGETY OR RESTLESS THAT YOU HAVE BEEN MOVING AROUND A LOT MORE THAN USUAL: NEARLY EVERY DAY
6. FEELING BAD ABOUT YOURSELF - OR THAT YOU ARE A FAILURE OR HAVE LET YOURSELF OR YOUR FAMILY DOWN: MORE THAN HALF THE DAYS
4. FEELING TIRED OR HAVING LITTLE ENERGY: SEVERAL DAYS
10. IF YOU CHECKED OFF ANY PROBLEMS, HOW DIFFICULT HAVE THESE PROBLEMS MADE IT FOR YOU TO DO YOUR WORK, TAKE CARE OF THINGS AT HOME, OR GET ALONG WITH OTHER PEOPLE: SOMEWHAT DIFFICULT
5. POOR APPETITE OR OVEREATING: NEARLY EVERY DAY
3. TROUBLE FALLING OR STAYING ASLEEP OR SLEEPING TOO MUCH: NOT AT ALL
1. LITTLE INTEREST OR PLEASURE IN DOING THINGS: MORE THAN HALF THE DAYS
9. THOUGHTS THAT YOU WOULD BE BETTER OFF DEAD, OR OF HURTING YOURSELF: NOT AT ALL

## 2025-02-19 ASSESSMENT — COLUMBIA-SUICIDE SEVERITY RATING SCALE - C-SSRS
2. HAVE YOU ACTUALLY HAD ANY THOUGHTS OF KILLING YOURSELF?: NO
6. HAVE YOU EVER DONE ANYTHING, STARTED TO DO ANYTHING, OR PREPARED TO DO ANYTHING TO END YOUR LIFE?: NO
1. IN THE PAST MONTH, HAVE YOU WISHED YOU WERE DEAD OR WISHED YOU COULD GO TO SLEEP AND NOT WAKE UP?: NO

## 2025-02-19 ASSESSMENT — PAIN SCALES - GENERAL: PAINLEVEL_OUTOF10: 0-NO PAIN

## 2025-02-19 NOTE — ASSESSMENT & PLAN NOTE
Discussed, will defer increase in sertraline due to side effects.   Will increase buspirone to 15 mg TID  Will refer to psych for further input. Pt agreeable to this.

## 2025-02-19 NOTE — PROGRESS NOTES
"[facility]    SUBJECTIVE    Chano MEDELLIN Diaz is a 34 y.o. right-handed male who presents with   Chief Complaint   Patient presents with    Seizures     FU visit        Presents for follow up visit  Visit type: in-clinic visit    HISTORY OF PRESENT ILLNESS    RECAP:  Former patient of Dr. Solis. First saw 6/1/22 for sz. Prev pt of Dr Olson. 2014 apparently had aseptic meningitis, HIV diagnosed during that admission. Started on anti-retroviral, viral load had been undetectable since. 2019 started having sz. Semiology: blank out, collapse, convulse x30 seconds, wake up disoriented, don't become coherent of surroundings for ~10-15 min. Has had two sz lifetime. 8/2019 and 12/2019. ER started on levetiracetam 500mg bid the second time. Never had any since levetiracetam started. Both sz he states he was under a lot of physical and emotional stress. 2nd sz alcohol may have been a factor. Currently on levetiracetam 500mg 1.5 tabs bid. Doing well. No sz. Dose increased due to low levetiracetam level to current dose. No dizziness. No tiredness. No SE. HIV, on antiretrovirals. Undetectable viral load since 2014. CD4 and CD8 good. I do not have any records of this. Some days he drinks etoh, some days not. Most days drinks etoh. Drinks 1-10 cans beer/day, or glasses of wine, or shots of liquor. Doesn't want to quit. No HA. No prior hx known seizure (none prior to 2019). Never was on AED as a child. Born FT. No developmental delay. Smoke tobacco, 1/2 ppd. No street drug use, no marijuana. During last visit, discussed alcohol cessation and change LVT to 750mg pill, continue 1 tab bid.     2019 EEG normal   2019 MRI brain wwo normal     ID doctor: [facility] Dr Armand Ervin     Last sz 2019. On LVT 750mg bid, doing well, denies SE.     Drinks 3-5 drinks \"a few times a week\". He is not interested in quitting alcohol. He states he has been \"moderating\" alcohol use.     No major medical issues since " last visit. States HIV stable.      11/19/24 - Pt states he has had a rough year. Did not have insurance from march until november so missed his appt with me in July. C/o depression anxiety, and relationship issues. Not sleeping well, mind racing. No appetite.      No seizures. Patient denies any auras, shaking, jerking, convulsions, loss of time, zoning out, waking up with tongue or cheek bites, incontinence or unexplained bruising.     Stress is not improving. Reaching out to counselors/therapist to find one he feels comfortable with and accepts his insurance.       He is a . Works until 1-3 am, goes to bed 4-5 am, may not fall asleep 7-8 am. Sleeping 4-14 hours, depends.        Drinking more days than not recently. Between 2-10 drinks. Canned cocktails.      Taking keppra at 3 am and 3 pm typically. Tolerating well.      Continue Keppra, refills sent. Discussed improving possible triggers/factors that can decrease seizure threshold including sleep deprivation, alcohol use and stress. Will try to start some medications to help with stress and sleep. Discussed some options, will start sertraline due to his ETOH use, start at 25 mg prior to sleep. Start Buspar 5-10 mg up to TID PRN for anxiety. Pt agreeable to this. Cut back on ETOH use. Pt states understanding. Additionally discussed seizure rescue medication, Nayzilam ordered, given instructions on dosing and administration, copay card given.     01/06/25 - Presents on call alone.      Since last visit, Nayzilam was denied by insurance. This was appealed, have not heard back.      Having more better days now per pt. Sleeping somewhat better but not consistently. Still some trouble falling asleep. Once asleep, sometimes still waking up.      Sertraline possibly helping some with the mood. Sober x 1 week. A little dizziness with it. Having decreased sex drive.      Buspar helping with general anxiety but noticing social anxiety, taking PRN but has noticed  that he is consistently taking it 3 times per day. The social anxiety is significant.    Doing well with medications. Buspar is helping but still having anxiety and more social anxiety. Taking 5 mg TID consistently. Will increase to 10 mg TID.   Discussed increase in sertraline for anxiety as well but pt is having some decreased sex drive on it so will hold off.   Still having trouble sleeping, will try  mg trazodone PRN for sleep. Pt agreeable to this plan.   Discussed possible referral to psych NP in the future if needed. Pt states understanding.     02/19/25 - pt presents alone for today's visit.     Sleep is better since starting the trazodone, falling asleep within 5-10 min, still tired overall though. Sleeping 6-8 hours.     Anxiety is better on the 10 mg buspirone 3 times per day but still present.     Depression is still there. Was seemingly better initially but in the past month is starting to  again.     Sober from alcohol x 8 weeks now.     Still sex drive issues with the sertraline, some improvement but still there.     Did not get nayzilam yet from pharmacy. I did print approval for him to show to pharmacist.     REVIEW OF SYSTEMS:  10 point review of systems performed and is negative except as noted in the HPI.     Past Medical History:   Diagnosis Date    Anxiety 11/15/2024    Depression 11/15/2024    HIV disease (Multi) 10/2024    Personal history of other specified conditions 03/10/2021    History of dysuria    Seizures (Multi) 8/2019       No relevant family history has been documented for this patient.    Past Surgical History:   Procedure Laterality Date    CIRCUMCISION, PRIMARY  03/1991    COLON SURGERY  2016    FLEXIBLE SIGMOIDOSCOPY         Social History     Substance and Sexual Activity   Drug Use Not Currently    Types: Marijuana     Tobacco Use: High Risk (2/19/2025)    Patient History     Smoking Tobacco Use: Every Day     Smokeless Tobacco Use: Never     Passive Exposure:  Not on file     Alcohol Use: Not At Risk (1/13/2025)    AUDIT-C     Frequency of Alcohol Consumption: Never     Average Number of Drinks: Patient does not drink     Frequency of Binge Drinking: Never       Current Outpatient Medications   Medication Instructions    ascorbic acid (VITAMIN C) 1,000 mg, Daily    B complex-vitamin C-folic acid (Nephro-Dami Rx) 1- mg-mg-mcg tablet 1 tablet, Daily with breakfast    busPIRone (BUSPAR) 15 mg, oral, 3 times daily    cetirizine (ZyrTEC) 10 mg chewable tablet Daily    cholecalciferol (Vitamin D-3) 25 MCG (1000 UT) capsule Daily    CREATINE, BULK, MISC No dose, route, or frequency recorded.    emtricitab/rilpiviri/tenof ala (ODEFSEY ORAL) Take by mouth.    levETIRAcetam (KEPPRA) 750 mg, oral, 2 times daily    loratadine (CLARITIN REDITABS) 10 mg, Daily    nasal spray midazolam (Nayzilam) 5 mg/spray (0.1 mL) spray,non-aerosol 1 spray, nasal, As needed    sertraline (ZOLOFT) 25 mg, oral, Daily    tiZANidine (ZANAFLEX) 2 mg, oral, Nightly PRN    traZODone (DESYREL) 50 mg, oral, Nightly PRN         OBJECTIVE    /77   Pulse 97   Wt 67.8 kg (149 lb 6.4 oz)   BMI 22.72 kg/m²       Physical Exam  Psychiatric:         Speech: Speech normal.       Neurological Exam  Mental Status  Awake, alert and oriented to person, place and time. Speech is normal. Language is fluent with no aphasia. Attention and concentration are normal. Fund of knowledge is appropriate for level of education.    Cranial Nerves  CN II-XII grossly intact.    Motor  Normal muscle bulk throughout. No fasciculations present. Normal muscle tone. No abnormal involuntary movements.  Strength at least antigravity throughout.    Sensory  Light touch is normal in upper and lower extremities.     Coordination  Right: Finger-to-nose normal.Left: Finger-to-nose normal.    Gait  Casual gait is normal including stance, stride, and arm swing.        No results found for this or any previous visit from the past 1825  days.      Lab Results   Component Value Date    WBC 5.4 11/19/2024    RBC 4.67 11/19/2024    HGB 15.7 11/19/2024    HCT 46.5 11/19/2024     11/19/2024     11/19/2024    K 3.7 11/19/2024     11/19/2024    BUN 15 11/19/2024    CREATININE 0.87 11/19/2024    EGFR >90 11/19/2024    CALCIUM 9.2 11/19/2024    ALKPHOS 84 11/19/2024    AST 56 (H) 11/19/2024    ALT 43 11/19/2024          ASSESSMENT & PLAN  Problem List Items Addressed This Visit       Seizure disorder (Multi)    Overview     Possibly generalized  In background of history of aseptic meningitis (2014) and ongoing alcohol abuse  No other AED  2019 EEG wnl, 2019 MRI brain wwo wnl  Last sz 2019 per pt  On LVT 750mg bid         Current Assessment & Plan     Continue Keppra          Relevant Orders    Follow Up In Neurology    Anxiety and depression    Overview     On Sertraline 25 mg nightly - some decreased sex drive  On Buspar 10 mg TID for anxiety -- increase to 15 mg TID         Current Assessment & Plan     Discussed, will defer increase in sertraline due to side effects.   Will increase buspirone to 15 mg TID  Will refer to psych for further input. Pt agreeable to this.          Relevant Medications    busPIRone (Buspar) 15 mg tablet    Other Relevant Orders    Referral to Psychiatry    Follow Up In Neurology    Alcohol abuse, in remission - Primary    Current Assessment & Plan     Sober x 8 weeks         Chronic insomnia    Overview     Doing well on trazodone  mg PRN nightly         Relevant Orders    Follow Up In Neurology         FU in 6 months         Ne Alba, APRN-CNP

## 2025-02-20 ENCOUNTER — TREATMENT (OUTPATIENT)
Dept: PHYSICAL THERAPY | Facility: HOSPITAL | Age: 35
End: 2025-02-20
Payer: COMMERCIAL

## 2025-02-20 DIAGNOSIS — M25.562 CHRONIC PAIN OF LEFT KNEE: ICD-10-CM

## 2025-02-20 DIAGNOSIS — M62.81 MUSCLE WEAKNESS OF LOWER EXTREMITY: ICD-10-CM

## 2025-02-20 DIAGNOSIS — G89.29 CHRONIC PAIN OF LEFT KNEE: ICD-10-CM

## 2025-02-20 PROCEDURE — 97110 THERAPEUTIC EXERCISES: CPT | Mod: GP,CQ

## 2025-02-20 NOTE — PROGRESS NOTES
"Physical Therapy    Physical Therapy Treatment    Patient Name: Chano Diaz  MRN: [id number]  : 1990   Today's Date: 2025  Time Calculation  Start Time: 345  Stop Time: 430  Time Calculation (min): 45 min  Visit #8    PT Therapeutic Procedures Time Entry  Therapeutic Exercise Time Entry: 45          Current Problem  1. Chronic pain of left knee  Follow Up In Physical Therapy      2. Muscle weakness of lower extremity  Follow Up In Physical Therapy            Subjective   Pt states he has slight pain in knee today.        Precautions     STEADI Fall Risk Score (The score of 4 or more indicates an increased risk of falling): 2  Precautions Comment: none noted       Pain   1/10    Objective    See flow sheet       Treatments:  EXERCISES Date 24   VISIT # #4 #5 #6 #7 #8   HEP                        Bike 10' L6 10' L6 10' L6 10' L6 10' L6           Shuttle  DLP 6b 2x10 6b 2x10 6b 3x10 6b 3x10 6b 2 x 20   Shuttle SLP 4b 2x10 4b 2x10 4b 3x10 4b 3x10 4b 2 x 20   Shuttle HR 4b 2x10 4b 2x10 4b 3x10 4b 3x10 4b 2 x 20           Qhip Flexion 5# 2x10 Lan 5# 2x10 Lan 10# 2x10 Lan 10# 2x10 Lan 10# 2x10 Lan   Qhip Extension 5# 2x10 Lan 5# 2x10 Lan 10# 2x10 Lan 10# 2x10 Lan 10# 2x10 Lan   Qhip Abduction 5# 2x10 Lan 5# 2x10 Lan 10# 2x10 Lan 10# 2x10 Lan 10# 2x10 Lan   Qhip  TKE 15# 1x10 left 15# 2x10 left 20# 2x10 left 20# 2x10 left 20# 2x10 left           Squat body mechanics 2x10 2x10  2x10  2x10  2x10                    ITB stretch        Gastroc stretch 30\"x3 30\"x3 30\"x3 30\"x3 30\"x3   Hamstring stretch                Bridges        Clamshells        Reverse clamshells                Step Ups FWD/LAT 2x10 Lan both dir 6 in 2x10 Lan both dir 6 in 2x10 Lan both dir 8 in 2x10 Lan both dir 8 in 2x10 Lan both dir                                     Assessment:  Pt reports today squats caused some discomfort in L knee. Pt reports knee feels a little sore today after treatment. Pt declined " CP.       Plan:     Cont with stretching and strengthening to decrease knee pain.     OP EDUCATION:       Goals:  Active       L knee pain       1) Independent progressive home exercise program with 90% teach back capability by the patient       Start:  01/28/25    Expected End:  03/11/25            2) Pt to report L knee pain <5/10 with stair climbing and squatting to enable normal job performance        Start:  01/28/25    Expected End:  03/11/25            3) Improve MMT of Lan LE's to 5/5 or better to improve joint stability with squatting       Start:  01/28/25    Expected End:  03/11/25            4) Functional Outcome LEFS score improves to >70/80       Start:  01/28/25    Expected End:  03/11/25            5) Pt to perform >45 minutes of increased activities without difficulty or increased pain to return to normal recreational activities       Start:  01/28/25    Expected End:  03/11/25

## 2025-02-25 ENCOUNTER — TREATMENT (OUTPATIENT)
Dept: PHYSICAL THERAPY | Facility: HOSPITAL | Age: 35
End: 2025-02-25
Payer: COMMERCIAL

## 2025-02-25 DIAGNOSIS — M25.562 CHRONIC PAIN OF LEFT KNEE: ICD-10-CM

## 2025-02-25 DIAGNOSIS — M62.81 MUSCLE WEAKNESS OF LOWER EXTREMITY: ICD-10-CM

## 2025-02-25 DIAGNOSIS — G89.29 CHRONIC PAIN OF LEFT KNEE: ICD-10-CM

## 2025-02-25 LAB
C COLI+JEJUNI+LARI FUSA STL QL NAA+PROBE: NOT DETECTED
EC STX1 GENE STL QL NAA+PROBE: NOT DETECTED
EC STX2 GENE STL QL NAA+PROBE: NOT DETECTED
NOROV GI+II ORF1-ORF2 JNC STL QL NAA+PR: NOT DETECTED
RVA NSP5 STL QL NAA+PROBE: NOT DETECTED
SALMONELLA SP RPOD STL QL NAA+PROBE: NOT DETECTED
SHIGELLA DNA SPEC QL NAA+PROBE: NOT DETECTED
V CHOL+PARA RFBL+TRKH+TNAA STL QL NAA+PR: NOT DETECTED
Y ENTERO RECN STL QL NAA+PROBE: NOT DETECTED

## 2025-02-25 PROCEDURE — 97110 THERAPEUTIC EXERCISES: CPT | Mod: GP | Performed by: PHYSICAL THERAPIST

## 2025-02-25 ASSESSMENT — PAIN SCALES - GENERAL: PAINLEVEL_OUTOF10: 0 - NO PAIN

## 2025-02-25 ASSESSMENT — PAIN - FUNCTIONAL ASSESSMENT: PAIN_FUNCTIONAL_ASSESSMENT: 0-10

## 2025-02-25 NOTE — PROGRESS NOTES
Physical Therapy    Physical Therapy Reassessment    Patient Name: Chano Diaz  MRN: [id number]  : 1990  Today's Date: 2025  Time Calculation  Start Time: 1450  Stop Time: 1540  Time Calculation (min): 50 min    PT Therapeutic Procedures Time Entry  Therapeutic Exercise Time Entry: 50          VISIT:# 9    Current Problem  Problem List Items Addressed This Visit             ICD-10-CM    Chronic pain of left knee M25.562, G89.29    Muscle weakness of lower extremity M62.81        Subjective   Reason for Referral: L knee pain  Referred By: Dr. Candelario  Past Medical History Relevant to Rehab: Seizure disorder, STI, anxiety/depressionPt denies any L knee pain today, but reports it was a 1/10 yesterday. The L knee pain has been less frequent lately and PT seems to have been helping. Climbing stairs and increased standing at work continue to be uncomfortable.     Pain  Pain Assessment: 0-10  0-10 (Numeric) Pain Score: 0 - No pain  Pain Location: Knee  Pain Orientation: Left     Objective    Lower Extremity Strength:  LE strength not listed below is WNL  MMT 5/5 max  LEFT   Hip Flexion 4   Hip Extension 4   Hip Abduction 4,4+   Hip Adduction 5   Knee Extension 4* ant knee   Knee Flexion 4+,5   Ankle DF 5   Ankle PF 5   *denotes pain     Lower Extremity ROM:   LE ROM not listed below is WNL  Knee AROM: Left 0-155 deg           Precautions  Precautions  STEADI Fall Risk Score (The score of 4 or more indicates an increased risk of falling): 2  Precautions Comment: none noted    Treatments:  EXERCISES 25   VISIT # #6 #7 #8 #9   HEP                     Bike 10' L6 10' L6 10' L6 8' seat 6          Shuttle  DLP 6b 3x10 6b 3x10 6b 2 x 20 7B 2x10   Shuttle SLP 4b 3x10 4b 3x10 4b 2 x 20    Shuttle HR 4b 3x10 4b 3x10 4b 2 x 20 6B 2x10          Qhip Flexion 10# 2x10 Lan 10# 2x10 Lna 10# 2x10 Lan 20# 2x10 ea   Qhip Extension 10# 2x10 Lan 10# 2x10 Lan 10# 2x10 Lan 20# x20 ea   Qhip Abduction 10#  "2x10 Lan 10# 2x10 Lan 10# 2x10 Lan 15# 1x10, 20# 1x10 ea   Qhip L TKE 20# 2x10 left 20# 2x10 left 20# 2x10 left 30# 2x10          Squat body mechanics 2x10  2x10  2x10  1x10 w/manual patellar assist                 ITB stretch       Gastroc stretch 30\"x3 30\"x3 30\"x3    Hamstring stretch              Bridges       Clamshells       Reverse clamshells              Step Ups FWD/LAT 6 in 2x10 Lan both dir 8 in 2x10 Lan both dir 8 in 2x10 Lan both dir                                            Assessment:   Pt's L LE strength has improved somewhat since beginning therapy and knee ROM is WNL, but hip and quad weakness still persists. Pt reported some anterior distal patellar discomfort with squats at mid range, but this pain was alleviated with A>P patellar compression and also L>M patellar stabilization. Discussed we will trial Maxwell taping next visit and to bring shorts. Pt agreeable to continue PT x2 more weeks to further progress his LE strength and patellar stabilization. No knee pain reported after tx.       Plan:    Continue PT 2x/wk x2 more weeks and trial Maxwell taping L knee Lat>Med    Goals:  Active       L knee pain       1) Independent progressive home exercise program with 90% teach back capability by the patient (Progressing)       Start:  01/28/25    Expected End:  03/11/25            2) Pt to report L knee pain <5/10 with stair climbing and squatting to enable normal job performance  (Progressing)       Start:  01/28/25    Expected End:  03/11/25            3) Improve MMT of Lan LE's to 5/5 or better to improve joint stability with squatting (Progressing)       Start:  01/28/25    Expected End:  03/11/25            4) Functional Outcome LEFS score improves to >70/80 (Met)       Start:  01/28/25    Expected End:  03/11/25    Resolved:  02/25/25         5) Pt to perform >45 minutes of increased activities without difficulty or increased pain to return to normal recreational activities (Progressing)  "      Start:  01/28/25    Expected End:  03/11/25

## 2025-03-02 LAB
C DIFF TOX GENS STL QL NAA+PROBE: NOT DETECTED
CALPROTECTIN STL-MCNT: <5 MCG/G
ELASTASE PANC STL-MCNT: 114 MCG/G

## 2025-03-03 ENCOUNTER — TREATMENT (OUTPATIENT)
Dept: PHYSICAL THERAPY | Facility: HOSPITAL | Age: 35
End: 2025-03-03
Payer: COMMERCIAL

## 2025-03-03 DIAGNOSIS — M62.81 MUSCLE WEAKNESS OF LOWER EXTREMITY: ICD-10-CM

## 2025-03-03 DIAGNOSIS — M25.562 CHRONIC PAIN OF LEFT KNEE: ICD-10-CM

## 2025-03-03 DIAGNOSIS — G89.29 CHRONIC PAIN OF LEFT KNEE: ICD-10-CM

## 2025-03-03 PROCEDURE — 97110 THERAPEUTIC EXERCISES: CPT | Mod: GP,CQ

## 2025-03-03 ASSESSMENT — PAIN - FUNCTIONAL ASSESSMENT: PAIN_FUNCTIONAL_ASSESSMENT: 0-10

## 2025-03-03 ASSESSMENT — PAIN SCALES - GENERAL: PAINLEVEL_OUTOF10: 1

## 2025-03-03 NOTE — PROGRESS NOTES
Physical Therapy    Physical Therapy Treatment    Patient Name: Chano Diaz  MRN: [id number]  : 1990  Today's Date: 3/3/2025  Time Calculation  Start Time: 1525  Stop Time: 1612  Time Calculation (min): 47 min    PT Therapeutic Procedures Time Entry  Therapeutic Exercise Time Entry: 47          VISIT:# 10    Current Problem  Problem List Items Addressed This Visit             ICD-10-CM       Musculoskeletal and Injuries    Chronic pain of left knee M25.562, G89.29    Muscle weakness of lower extremity M62.81        Subjective   Reason for Referral: L knee pain  Referred By: Dr. Candelario  Past Medical History Relevant to Rehab: Seizure disorder, STI, anxiety/depression   Pt states that today he is experiencing some slight pain in his L knee. He went bowling last night (3/2/25) and states that he was still feeling a little unstable when going into a lunge with the L knee. He also stated that his pain in the L knee got up to a 3/10 while bowling but the pain went down afterwards. He woke up today with pain in the L knee that he rated a 1-2/10.  Pain  Pain Assessment: 0-10  0-10 (Numeric) Pain Score: 1  Pain Location: Knee  Pain Orientation: Left       Objective                 Precautions  Precautions  STEADI Fall Risk Score (The score of 4 or more indicates an increased risk of falling): 2  Precautions Comment: none noted      Treatments:     EXERCISES 2/13/25 2/18/25 2/20/25 2/25/25 3/3/2025    VISIT # #6 #7 #8 #9 #10   HEP                        Bike 10' L6 10' L6 10' L6 8' seat 6 10' seat 6           Shuttle  DLP 6b 3x10 6b 3x10 6b 2 x 20 7B 2x10 7B 2x15   Shuttle SLP 4b 3x10 4b 3x10 4b 2 x 20  5B 2x15 ea   Shuttle HR 4b 3x10 4b 3x10 4b 2 x 20 6B 2x10 6B 2x15           Qhip Flexion 10# 2x10 Lan 10# 2x10 Lan 10# 2x10 Lan 20# 2x10 ea 20# 2x10 ea   Qhip Extension 10# 2x10 Lan 10# 2x10 Lan 10# 2x10 Lan 20# x20 ea 20# 2x10 ea   Qhip Abduction 10# 2x10 Lan 10# 2x10 Lan 10# 2x10 Lan 15# 1x10, 20# 1x10 ea 20# 2x10 ea  "  Anthony L TKE 20# 2x10 left 20# 2x10 left 20# 2x10 left 30# 2x10 30# 2x10 left           Squat body mechanics 2x10  2x10  2x10  1x10 w/manual patellar assist 2x10 w/ McConnel taping                   ITB stretch        Gastroc stretch 30\"x3 30\"x3 30\"x3  30\"x3   Hamstring stretch                Bridges        Clamshells        Reverse clamshells                Step Ups FWD/LAT 6 in 2x10 Lan both dir 8 in 2x10 Lan both dir 8 in 2x10 Lan both dir  8in 2x10 both dir                                                          Assessment:   At the end of the session pt reports that he was not experiencing any pain. Today we did a trial of McConnel tape on the left knee to prevent medial tracking of the left patella. He stated that with the tape he did not experience as much pain when performing squats or the leg press. Overall he tolerated treatment well and was able to complete exercises with minimal difficulty and no complaints of increased pain or discomfort.         Plan:    Monitor pt's reaction to McConnel tape. Continue to progress LE strength for improved daily function.    Goals:  Active       L knee pain       1) Independent progressive home exercise program with 90% teach back capability by the patient (Progressing)       Start:  01/28/25    Expected End:  03/11/25            2) Pt to report L knee pain <5/10 with stair climbing and squatting to enable normal job performance  (Progressing)       Start:  01/28/25    Expected End:  03/11/25            3) Improve MMT of Lan LE's to 5/5 or better to improve joint stability with squatting (Progressing)       Start:  01/28/25    Expected End:  03/11/25            4) Functional Outcome LEFS score improves to >70/80 (Met)       Start:  01/28/25    Expected End:  03/11/25    Resolved:  02/25/25         5) Pt to perform >45 minutes of increased activities without difficulty or increased pain to return to normal recreational activities (Progressing)       Start:  01/28/25    " Expected End:  03/11/25

## 2025-03-04 ENCOUNTER — TELEMEDICINE CLINICAL SUPPORT (OUTPATIENT)
Dept: NUTRITION | Facility: HOSPITAL | Age: 35
End: 2025-03-04
Payer: COMMERCIAL

## 2025-03-04 DIAGNOSIS — R68.89 ABNORMAL WEIGHT: ICD-10-CM

## 2025-03-04 DIAGNOSIS — K52.9 CHRONIC DIARRHEA OF UNKNOWN ORIGIN: ICD-10-CM

## 2025-03-04 PROCEDURE — 97802 MEDICAL NUTRITION INDIV IN: CPT | Mod: 95

## 2025-03-04 NOTE — PROGRESS NOTES
Nutrition Assessment     Reason for Visit:  Chaon Diaz is a pleasant 34 y.o. male who presents for chronic diarrhea of unknown origin. Pt also voices that he has been unable to put on weight. Works as a  and manager.    Anthropometrics:  Anthropometrics  IBW/kg (Dietitian Calculated): 70 kg  Weight Change  Weight History / % Weight Change: [facility] records show a 7 lb, or 5%, weight gain in 2 months  Significant Weight Loss: No    Daily Weight  02/19/25 : 67.8 kg (149 lb 6.4 oz)  02/12/25 : 67.1 kg (148 lb)  01/13/25 : 67.1 kg (148 lb)  11/19/24 : 64.5 kg (142 lb 1.6 oz)  07/25/23 : 57.7 kg (127 lb 4.8 oz)  04/28/21 : 65.8 kg (145 lb)  03/10/21 : 65.8 kg (145 lb)  02/15/21 : 65.8 kg (145 lb)     Food And Nutrient Intake:  Food and Nutrient History  Food and Nutrient History: Pt reports that he has never been on a specific diet in the past. States that he will sometimes forget to eat throughout the day, these incidents are inconsistent. On a typical day, will often eat approximately q4h. Reports drinking fluids consistently throughout the day  Food Allergy:  (NKFA)  Food Intolerance: Pt reports that some, but not all, dairy products cause symptoms. Only certain brands of sour cream are avoided, cheese and cream-based foods are ok. Uses almond or oatmilk in cooking/smoothies.     Food Intake  Meal 1: 10-11 am: Matcha latte, Smoothie (made with raspberries, strawberries, pineapple, fruit juice, oatmilk, and supplement powder - creatine, fiber, collagen, protein powder) or toasted seed bread with almond butter or eggs  Snacks: Pears or apples or nuts/seeds or hb eggs  Meal 2: [employer] or Chipotle or food from a local restaurant  Snacks : Cho[employer]ani protein yogurt drink, occasionally chocolate  Meal 3: Chicken or steak or salmon with rice or potatoes with roasted vegetables  Food Variety: Present  Fluid Intake: Water, coffee, tea  Pattern of Alcohol Consumption: None at this time, quite drinking all alcohol back in  December of 2024 in hopes of this improving GI symptoms.    Food Preparation  Cooking: Patient  Grocery Shopping: Patient  Dining Out: More than 3 times a week    Bioactive Substance Intake  Pattern of Alcohol Consumption: None at this time, quite drinking all alcohol back in December of 2024 in hopes of this improving GI symptoms.    Food Supplement Intake  Herbal Supplements: Reports using a appetite stimulant supplement and a supplement marketed for weight gain (made with prune, letty, and a variety of digestive enzymes), but has noticed no difference on these.    Nutrition Focused Physical Exam:  Deferred, virtual appt. No malnutrition suspected.     Physical Findings  Digestive System Findings: Diarrhea (Chronic diarrhea for the past 6 months. Pt states that some days are worse than others. Will have several BM in the morning (before work) and 1-2 times during the work day (evening).)    Energy Needs  Estimated Energy Needs  Total Energy Estimated Needs in 24 hours (kCal):  (9120-9723)  Method for Estimating Needs: 22-25 kcal/kg ABW  Estimated Fluid Needs  Total Fluid Estimated Needs in 24 Hours (mL): 2400 mL  Method for Estimating 24 Hour Fluid Needs: 35 ml/kg  Estimated Protein Needs  Total Protein Estimated Needs in 24 Hours (g):  (55-70)  Method for Estimating 24 Hour Protein Needs: 0.8-1.0 g/kg ABW      Nutrition Diagnosis   Malnutrition Diagnosis  Patient has Malnutrition Diagnosis: No  Nutrition Diagnosis  Patient has Nutrition Diagnosis: Yes  Diagnosis Status (1): New  Nutrition Diagnosis 1: Altered GI function  Related to (1): change in GI tract motility secondary to chronic diarrhea  As Evidenced by (1): pt report of chronic diarrhea for 6 months      Nutrition Interventions/Recommendations   Food and Nutrition Delivery  Meals & Snacks: General Healthful Diet, Modification of schedule of oral intake, Fluid-modified diet  Goals: Smaller, more frequent meals and snacks; eat a meal or snack  approximately q4h throughout the day; keep a food and symptoms diary to monitor for potentially aggrivating foods; continue drinking fluids frequently throughout the day to prevent dehydration from diarrhea  I was unable to provide more specific recommendations as pt has no known (diagnosis) cause of diarrhea at this time.  All questions answered to pt satisfaction. RD information provided for further questions.    There are no Patient Instructions on file for this visit.    Pt is agreeable to keep a food and symptoms diary, continue drinking fluids frequently throughout the day, and try smaller and more frequent meals and snacks (setting timers to remember to eat when appetite is poor).      Nutrition Monitoring and Evaluation   Food and Nutrient Intake  Monitoring and Evaluation Plan: Fluid intake, Meal/snack pattern  Criteria: aim for at least 64 oz of water daily  Meal/Snack Pattern: Estimated meal and snack pattern  Criteria: Aim for 3 meals/day with 1-2 snack  Anthropometric measurements  Monitoring and Evaluation Plan: Weight change  Body Weight Change: Body weight gain, Body weight change intent  Criteria: Weight gain          Time Spent  Prep time on day of patient encounter: 5 minutes  Time spent directly with patient, family or caregiver: 30 minutes ([phone])  Additional Time Spent on Patient Care Activities: 0 minutes  Documentation Time: 15 minutes  Other Time Spent: 0 minutes  Total: 50 minutes        Readiness to Change : Good  Level of Understanding : Excellent  Anticipated Compliant : Good

## 2025-03-05 ENCOUNTER — TREATMENT (OUTPATIENT)
Dept: PHYSICAL THERAPY | Facility: HOSPITAL | Age: 35
End: 2025-03-05
Payer: COMMERCIAL

## 2025-03-05 ENCOUNTER — APPOINTMENT (OUTPATIENT)
Facility: CLINIC | Age: 35
End: 2025-03-05
Payer: COMMERCIAL

## 2025-03-05 VITALS
SYSTOLIC BLOOD PRESSURE: 116 MMHG | WEIGHT: 148 LBS | HEART RATE: 76 BPM | HEIGHT: 68 IN | BODY MASS INDEX: 22.43 KG/M2 | DIASTOLIC BLOOD PRESSURE: 77 MMHG

## 2025-03-05 DIAGNOSIS — G89.29 CHRONIC PAIN OF LEFT KNEE: ICD-10-CM

## 2025-03-05 DIAGNOSIS — M62.81 MUSCLE WEAKNESS OF LOWER EXTREMITY: ICD-10-CM

## 2025-03-05 DIAGNOSIS — R19.5 LOW FECAL ELASTASE LEVEL: ICD-10-CM

## 2025-03-05 DIAGNOSIS — K52.9 CHRONIC DIARRHEA OF UNKNOWN ORIGIN: Primary | ICD-10-CM

## 2025-03-05 DIAGNOSIS — M25.562 CHRONIC PAIN OF LEFT KNEE: ICD-10-CM

## 2025-03-05 PROCEDURE — 3008F BODY MASS INDEX DOCD: CPT | Performed by: INTERNAL MEDICINE

## 2025-03-05 PROCEDURE — 4274F FLU IMMUNO ADMIND RCVD: CPT | Performed by: INTERNAL MEDICINE

## 2025-03-05 PROCEDURE — 97110 THERAPEUTIC EXERCISES: CPT | Mod: GP,CQ

## 2025-03-05 PROCEDURE — 99213 OFFICE O/P EST LOW 20 MIN: CPT | Performed by: INTERNAL MEDICINE

## 2025-03-05 RX ORDER — DICYCLOMINE HYDROCHLORIDE 10 MG/1
10 CAPSULE ORAL 3 TIMES DAILY
Qty: 90 CAPSULE | Refills: 1 | Status: SHIPPED | OUTPATIENT
Start: 2025-03-05 | End: 2025-04-04

## 2025-03-05 ASSESSMENT — PAIN SCALES - GENERAL: PAINLEVEL_OUTOF10: 0 - NO PAIN

## 2025-03-05 ASSESSMENT — PAIN - FUNCTIONAL ASSESSMENT: PAIN_FUNCTIONAL_ASSESSMENT: 0-10

## 2025-03-05 NOTE — ASSESSMENT & PLAN NOTE
Still with diarrhea on low-dose Imodium twice daily.  I did not adequately explain to the patient to titrate the dose upward.  I would like to try Bentyl 10 mg 3 times daily and he was instructed to contact me if he does not gain improved control of the loose stools or any side effects of the medication.  IBS D is suspected but with the low pancreatic elastase level EPI has to remain in the differential.    Orders:    Fecal Fat, Quantitative; Future    dicyclomine (Bentyl) 10 mg capsule; Take 1 capsule (10 mg) by mouth 3 times a day.

## 2025-03-05 NOTE — PROGRESS NOTES
Physical Therapy    Physical Therapy Treatment    Patient Name: Chano Diaz  MRN: [id number]  : 1990  Today's Date: 3/5/2025  Time Calculation  Start Time: 1430  Stop Time: 1515  Time Calculation (min): 45 min    PT Therapeutic Procedures Time Entry  Therapeutic Exercise Time Entry: 45          VISIT:# 11    Current Problem  Problem List Items Addressed This Visit             ICD-10-CM       Musculoskeletal and Injuries    Chronic pain of left knee M25.562, G89.29    Muscle weakness of lower extremity M62.81        Subjective   Reason for Referral: L knee pain  Referred By: Dr. Candelario  Past Medical History Relevant to Rehab: Seizure disorder, STI, anxiety/depression     At the beginning of the session pt stated that he was having no pain in his L knee. He stated that he felt the McConnel tape had helped a lot in the last session and wanted to continue using it. He has had no falls since his last visit.  Pain  Pain Assessment: 0-10  0-10 (Numeric) Pain Score: 0 - No pain  Pain Location: Knee  Pain Orientation: Left       Objective                 Precautions  Precautions  STEADI Fall Risk Score (The score of 4 or more indicates an increased risk of falling): 2  Precautions Comment: none noted      Treatments:     EXERCISES 2/13/25 2/18/25 2/20/25 2/25/25 3/3/2025  3/5/2025    VISIT # #6 #7 #8 #9 #10 #11   HEP                           Bike 10' L6 10' L6 10' L6 8' seat 6 10' seat 6 10' seat 6            Shuttle  DLP 6b 3x10 6b 3x10 6b 2 x 20 7B 2x10 7B 2x15 7B 2x15   Shuttle SLP 4b 3x10 4b 3x10 4b 2 x 20  5B 2x15 ea 6B 2x15 ea   Shuttle HR 4b 3x10 4b 3x10 4b 2 x 20 6B 2x10 6B 2x15 6B 2x15            Qhip Flexion 10# 2x10 Lan 10# 2x10 Lan 10# 2x10 Lan 20# 2x10 ea 20# 2x10 ea 20# 2x10 ea   Qhip Extension 10# 2x10 Lan 10# 2x10 Lan 10# 2x10 Lan 20# x20 ea 20# 2x10 ea 20# 2x10 ea   Qhip Abduction 10# 2x10 Lan 10# 2x10 Lan 10# 2x10 Lan 15# 1x10, 20# 1x10 ea 20# 2x10 ea 20# 2x10 ea   Qhip L TKE 20# 2x10 left 20# 2x10  "left 20# 2x10 left 30# 2x10 30# 2x10 left 30# 2x10 left            Squat body mechanics 2x10  2x10  2x10  1x10 w/manual patellar assist 2x10 w/ McConnel taping 2x10 w/ McConnel taping                     ITB stretch         Gastroc stretch 30\"x3 30\"x3 30\"x3  30\"x3 30\"x3   Hamstring stretch                  Bridges         Clamshells         Reverse clamshells                  Step Ups FWD/LAT 6 in 2x10 Lan both dir 8 in 2x10 Lan both dir 8 in 2x10 Lan both dir  8in 2x10 both dir 8in 2x10 both dir                                                                             Assessment:   After treatment pt stated that he had no pain in his left knee. He did state that he had some pain when squatting but said he was still having less pain with taping than without. He was able to complete exercises today with minimal difficulty and no complaints of pain except for the minimal pain when performing the squats.    Plan:    Patient had positive reaction to using the McConnel tape and would like to continue using it. Continue to progress LE strength as tolerated by pt.    Goals:  Active       L knee pain       1) Independent progressive home exercise program with 90% teach back capability by the patient (Progressing)       Start:  01/28/25    Expected End:  03/11/25            2) Pt to report L knee pain <5/10 with stair climbing and squatting to enable normal job performance  (Progressing)       Start:  01/28/25    Expected End:  03/11/25            3) Improve MMT of Lan LE's to 5/5 or better to improve joint stability with squatting (Progressing)       Start:  01/28/25    Expected End:  03/11/25            4) Functional Outcome LEFS score improves to >70/80 (Met)       Start:  01/28/25    Expected End:  03/11/25    Resolved:  02/25/25         5) Pt to perform >45 minutes of increased activities without difficulty or increased pain to return to normal recreational activities (Progressing)       Start:  01/28/25    Expected " End:  03/11/25

## 2025-03-05 NOTE — ASSESSMENT & PLAN NOTE
Modest decrease in pancreatic elastase.  His only risk factor would have been heavy alcohol use but radiographically there is no evidence of chronic pancreatitis or other pancreatic abnormalities.  He does not clinically have steatorrhea although early EPI or low fat intake in his diet might make this an unreliable endpoint.  I would like to try a 72-hour fecal fat collection on a 100 g/day high-fat diet.  If he does have further clinical evidence of exocrine pancreatic insufficiency we will give him a trial of pancreatic enzymes.

## 2025-03-05 NOTE — PROGRESS NOTES
[facility] Gastroenterology    ASSESSMENT and PLAN:       Assessment & Plan  Chronic diarrhea of unknown origin  Still with diarrhea on low-dose Imodium twice daily.  I did not adequately explain to the patient to titrate the dose upward.  I would like to try Bentyl 10 mg 3 times daily and he was instructed to contact me if he does not gain improved control of the loose stools or any side effects of the medication.  IBS D is suspected but with the low pancreatic elastase level EPI has to remain in the differential.    Orders:    Fecal Fat, Quantitative; Future    dicyclomine (Bentyl) 10 mg capsule; Take 1 capsule (10 mg) by mouth 3 times a day.    Low fecal elastase level    Orders:    Fecal Fat, Quantitative; Future  100 g fat per day  Considering pancreatic enzymes if further clinical evidence of exocrine pancreatic insufficiency.      aNs Winter MD    Gastroenterology  [facility]        Subjective   HISTORY OF PRESENT ILLNESS:     Chief Complaint  Follow-up (3-4 week follow up)    History Of Present Illness:      Chano Diaz is a 34 y.o. male with a significant past medical history of HIV, seizure disorder, alcohol abuse in remission, seen in follow up for chronic diarrhea.  He continues to have loose stools although less frequently on twice daily Imodium.  I did not explain adequately to him that we would titrate the dose upward to effect.  We discussed his negative tests for inflammatory conditions, infectious causes of loose stools, and the low pancreatic elastase.  On closer questioning he does not have bulky greasy stools and is not on a particularly low-fat diet.  Has had a chance to see the dietitian which he believes was helpful.  Denies new symptoms.    he follows with Ambrose Candelario MD as his PCP.       Review of systems:   Review of Systems  No bloody stools.  No abdominal pain.  No urgency.      PAST HISTORIES:       Past Medical  "History:  Patient Active Problem List   Diagnosis    Seizure disorder (Multi)    Anxiety and depression    Alcohol abuse, in remission    Anxiety    Dysuria    HIV (human immunodeficiency virus infection)    Palpable lymph node    Epilepsy    Chronic pain of left knee    Muscle weakness of lower extremity    Chronic diarrhea of unknown origin    Chronic insomnia    Low fecal elastase level     He has a past medical history of Anxiety (11/15/2024), Depression (11/15/2024), HIV disease (Multi) (10/2024), Personal history of other specified conditions (03/10/2021), and Seizures (Multi) (8/2019).    He has no past medical history of Personal history of irradiation.    Past Surgical History:  He has a past surgical history that includes Flexible sigmoidoscopy; Colon surgery (2016); and Circumcision, primary (03/1991).      Social History:  He reports that he has been smoking cigarettes. He started smoking about 15 years ago. He has a 15.2 pack-year smoking history. He has never used smokeless tobacco. He reports that he does not currently use alcohol. He reports that he does not currently use drugs after having used the following drugs: Marijuana.    Family History:  No known family history of GI disease, specifically denies any family history of pancreatitis, Crohn's, colon cancer, gastroesophageal cancer, or ulcerative colitis.    No family history on file.     Allergies:  Patient has no known allergies.      Objective   OBJECTIVE:       Last Recorded Vitals:  Vitals:    03/05/25 1321   BP: 116/77   Pulse: 76   Weight: 67.1 kg (148 lb)   Height: 1.727 m (5' 8\")     /77   Pulse 76   Ht 1.727 m (5' 8\")   Wt 67.1 kg (148 lb)   BMI 22.50 kg/m²      Physical Exam:    Physical Exam  Pleasant young male in no apparent distress.  No jaundice.  No scleral icterus.  Mood normal affect normal not further examined.  Home Medications:  Prior to Admission medications    Medication Sig Start Date End Date Taking? " Authorizing Provider   ascorbic acid (Vitamin C) 1,000 mg tablet Take 1 tablet (1,000 mg) by mouth once daily.    Historical Provider, MD   B complex-vitamin C-folic acid (Nephro-Dami Rx) 1- mg-mg-mcg tablet Take 1 tablet by mouth once daily with breakfast.  Patient taking differently: Take 1 tablet by mouth once daily with breakfast. occasionally    Historical Provider, MD   busPIRone (Buspar) 15 mg tablet Take 1 tablet (15 mg) by mouth 3 times a day. 2/19/25 2/19/26  CHELLY Camargo   cetirizine (ZyrTEC) 10 mg chewable tablet Chew once daily. Alternates with Claritin    Historical Provider, MD   cholecalciferol (Vitamin D-3) 25 MCG (1000 UT) capsule Take by mouth once daily.    Historical Provider, MD   CREATINE, BULK, MISC     Historical Provider, MD   dicyclomine (Bentyl) 10 mg capsule Take 1 capsule (10 mg) by mouth 3 times a day. 3/5/25 4/4/25  Nas Winter MD   emtricitab/rilpiviri/tenof ala (ODEFSEY ORAL) Take by mouth.    Historical Provider, MD   levETIRAcetam (Keppra) 750 mg tablet Take 1 tablet (750 mg) by mouth 2 times a day. 11/19/24 11/19/25  CHELLY Camargo   loratadine (Claritin Reditabs) 10 mg disintegrating tablet Dissolve 1 tablet (10 mg) in the mouth once daily. Alternates with zyrtec    Historical Provider, MD   nasal spray midazolam (Nayzilam) 5 mg/spray (0.1 mL) spray,non-aerosol Administer 1 spray into affected nostril(s) if needed (for seizure, may repeat dose in 10 minutes to other nostril if needed).  Patient taking differently: Administer 1 spray into affected nostril(s) if needed (for seizure, may repeat dose in 10 minutes to other nostril if needed). Hasn't gotten this yet 11/19/24   CHELLY Camargo   sertraline (Zoloft) 25 mg tablet Take 1 tablet (25 mg) by mouth once daily. 1/6/25 1/6/26  Ne Alba, APRN-CNP   tiZANidine (Zanaflex) 2 mg tablet TAKE 1 TABLET (2 MG) BY MOUTH AS NEEDED AT BEDTIME FOR MUSCLE SPASMS. 2/5/25 4/6/25  Ambrose PEREZ  MD Andree   traZODone (Desyrel) 50 mg tablet Take 1 tablet (50 mg) by mouth as needed at bedtime for sleep (take 1-2 tablets PRN for sleep). 1/31/25 1/31/26  Ne Alba, APRN-CNP         Relevant Results Recent labs reviewed in the EMR.    Lab Results   Component Value Date/Time    WBC 5.4 11/19/2024 1034    HGB 15.7 11/19/2024 1034    HGB 15.3 03/22/2020 1741    HGB 15.5 03/11/2020 1307     11/19/2024 1034     11/19/2024 1034     03/22/2020 1741     03/11/2020 1307       Lab Results   Component Value Date/Time     11/19/2024 1034    K 3.7 11/19/2024 1034     11/19/2024 1034    BUN 15 11/19/2024 1034    CREATININE 0.87 11/19/2024 1034    CREATININE 0.86 03/22/2020 1741    CREATININE 0.78 03/11/2020 1307       Lab Results   Component Value Date/Time    BILITOT 0.7 11/19/2024 1034    BILITOT 0.3 03/22/2020 1741    BILITOT 0.3 03/11/2020 1307    ALKPHOS 84 11/19/2024 1034    ALKPHOS 58 03/22/2020 1741    ALKPHOS 51 03/11/2020 1307    AST 56 (H) 11/19/2024 1034    AST 13 03/22/2020 1741    AST 19 03/11/2020 1307    ALT 43 11/19/2024 1034    ALT 7 (L) 03/22/2020 1741    ALT 20 03/11/2020 1307         Radiology: Imaging reviewed in the EMR.  No results found.

## 2025-03-18 ENCOUNTER — TREATMENT (OUTPATIENT)
Dept: PHYSICAL THERAPY | Facility: HOSPITAL | Age: 35
End: 2025-03-18
Payer: COMMERCIAL

## 2025-03-18 DIAGNOSIS — M62.81 MUSCLE WEAKNESS OF LOWER EXTREMITY: ICD-10-CM

## 2025-03-18 DIAGNOSIS — M25.562 CHRONIC PAIN OF LEFT KNEE: ICD-10-CM

## 2025-03-18 DIAGNOSIS — G89.29 CHRONIC PAIN OF LEFT KNEE: ICD-10-CM

## 2025-03-18 PROCEDURE — 97110 THERAPEUTIC EXERCISES: CPT | Mod: GP,CQ

## 2025-03-18 NOTE — PROGRESS NOTES
"Physical Therapy    Physical Therapy Treatment    Patient Name: Chano Diaz  MRN: [id number]  : 1990  Today's Date: 3/18/2025  Time Calculation  Start Time: 0300  Stop Time: 345  Time Calculation (min): 45 min    PT Therapeutic Procedures Time Entry  Therapeutic Exercise Time Entry: 45          VISIT:# 12    Current Problem  Problem List Items Addressed This Visit             ICD-10-CM    Chronic pain of left knee M25.562, G89.29    Muscle weakness of lower extremity M62.81        Subjective   Reason for Referral: L knee pain  Referred By: Dr. Candelario  Past Medical History Relevant to Rehab: Seizure disorder, STI, anxiety/depression     Pain      0/10    Objective    Increased resistance on exercises              Precautions  Precautions  Precautions Comment: none noted      Treatments:     EXERCISES 2/20/25 2/25/25 3/3/2025  3/5/2025  3/18/25   VISIT # #8 #9 #10 #11 #12   HEP                        Bike 10' L6 8' seat 6 10' seat 6 10' seat 6 10' seat 6           Shuttle  DLP 6b 2 x 20 7B 2x10 7B 2x15 7B 2x15 8B 2x15   Shuttle SLP 4b 2 x 20  5B 2x15 ea 6B 2x15 ea 6B 2x15 ea   Shuttle HR 4b 2 x 20 6B 2x10 6B 2x15 6B 2x15 6B 2x15           Qhip Flexion 10# 2x10 Lan 20# 2x10 ea 20# 2x10 ea 20# 2x10 ea 25# 2x10 ea   Qhip Extension 10# 2x10 Lan 20# x20 ea 20# 2x10 ea 20# 2x10 ea 25# 2x10 ea   Qhip Abduction 10# 2x10 Lan 15# 1x10, 20# 1x10 ea 20# 2x10 ea 20# 2x10 ea 25# 2x10 ea   Qhip L TKE 20# 2x10 left 30# 2x10 30# 2x10 left 30# 2x10 left 35# 2x10 left           Squat body mechanics 2x10  1x10 w/manual patellar assist 2x10 w/ McConnel taping 2x10 w/ McConnel taping 2x10 w/ McConnel taping                   ITB stretch        Gastroc stretch 30\"x3  30\"x3 30\"x3 30\"x3   Hamstring stretch                Bridges        Clamshells        Reverse clamshells                Step Ups FWD/LAT 8 in 2x10 Lan both dir  8in 2x10 both dir 8in 2x10 both dir 8in 2x10 both dir                                                      "     Assessment:   Pt tolerated exercises with no increased pain. Pt states knee taping seems to be helping.        Plan:    Cont with POC to decrease knee pain and increase strength.     Goals:  Active       L knee pain       1) Independent progressive home exercise program with 90% teach back capability by the patient (Progressing)       Start:  01/28/25    Expected End:  03/11/25            2) Pt to report L knee pain <5/10 with stair climbing and squatting to enable normal job performance  (Progressing)       Start:  01/28/25    Expected End:  03/11/25            3) Improve MMT of Lan LE's to 5/5 or better to improve joint stability with squatting (Progressing)       Start:  01/28/25    Expected End:  03/11/25            4) Functional Outcome LEFS score improves to >70/80 (Met)       Start:  01/28/25    Expected End:  03/11/25    Resolved:  02/25/25         5) Pt to perform >45 minutes of increased activities without difficulty or increased pain to return to normal recreational activities (Progressing)       Start:  01/28/25    Expected End:  03/11/25

## 2025-03-21 ENCOUNTER — TREATMENT (OUTPATIENT)
Dept: PHYSICAL THERAPY | Facility: HOSPITAL | Age: 35
End: 2025-03-21
Payer: COMMERCIAL

## 2025-03-21 DIAGNOSIS — M25.562 CHRONIC PAIN OF LEFT KNEE: ICD-10-CM

## 2025-03-21 DIAGNOSIS — M62.81 MUSCLE WEAKNESS OF LOWER EXTREMITY: ICD-10-CM

## 2025-03-21 DIAGNOSIS — G89.29 CHRONIC PAIN OF LEFT KNEE: ICD-10-CM

## 2025-03-21 PROCEDURE — 97110 THERAPEUTIC EXERCISES: CPT | Mod: GP,CQ

## 2025-03-21 ASSESSMENT — PAIN SCALES - GENERAL: PAINLEVEL_OUTOF10: 0 - NO PAIN

## 2025-03-21 ASSESSMENT — PAIN - FUNCTIONAL ASSESSMENT: PAIN_FUNCTIONAL_ASSESSMENT: 0-10

## 2025-03-21 NOTE — PROGRESS NOTES
Physical Therapy    Physical Therapy Treatment    Patient Name: Chano Diaz  MRN: [id number]  : 1990  Today's Date: 3/21/2025  Time Calculation  Start Time: 1349  Stop Time: 1433  Time Calculation (min): 44 min    PT Therapeutic Procedures Time Entry  Therapeutic Exercise Time Entry: 44          VISIT:# 13    Current Problem  Problem List Items Addressed This Visit             ICD-10-CM       Musculoskeletal and Injuries    Chronic pain of left knee M25.562, G89.29    Muscle weakness of lower extremity M62.81        Subjective   Reason for Referral: L knee pain  Referred By: Dr. Candelario  Past Medical History Relevant to Rehab: Seizure disorder, STI, anxiety/depression    Pt states that he is feeling well today. He is not having any pain in his L knee and has not had any recently. He has had no recent falls.     Pain  Pain Assessment: 0-10  0-10 (Numeric) Pain Score: 0 - No pain  Pain Location: Knee  Pain Orientation: Left       Objective                 Precautions  Precautions  Precautions Comment: none noted      Treatments:     EXERCISES 2/20/25 2/25/25 3/3/2025  3/5/2025  3/18/25 3/21/2025    VISIT # #8 #9 #10 #11 #12 #13   HEP                           Bike 10' L6 8' seat 6 10' seat 6 10' seat 6 10' seat 6 8' seat 6            Shuttle  DLP 6b 2 x 20 7B 2x10 7B 2x15 7B 2x15 8B 2x15 8B 2x15   Shuttle SLP 4b 2 x 20  5B 2x15 ea 6B 2x15 ea 6B 2x15 ea 7B 2x15 ea   Shuttle HR 4b 2 x 20 6B 2x10 6B 2x15 6B 2x15 6B 2x15 7B 2x15            Qhip Flexion 10# 2x10 Lan 20# 2x10 ea 20# 2x10 ea 20# 2x10 ea 25# 2x10 ea 25# 2x10 ea   Qhip Extension 10# 2x10 Lan 20# x20 ea 20# 2x10 ea 20# 2x10 ea 25# 2x10 ea 25# 2x10 ea   Qhip Abduction 10# 2x10 Lan 15# 1x10, 20# 1x10 ea 20# 2x10 ea 20# 2x10 ea 25# 2x10 ea 25# 2x10 ea   Qhip L TKE 20# 2x10 left 30# 2x10 30# 2x10 left 30# 2x10 left 35# 2x10 left 35# 2x10 Left            Squat body mechanics 2x10  1x10 w/manual patellar assist 2x10 w/ McConnel taping 2x10 w/ McConnel taping  "2x10 w/ McConnel taping 2x10 w/ McConnel taping                     ITB stretch         Gastroc stretch 30\"x3  30\"x3 30\"x3 30\"x3 30\" x 3   Hamstring stretch                  Bridges         Clamshells         Reverse clamshells                  Step Ups FWD/LAT 8 in 2x10 Lan both dir  8in 2x10 both dir 8in 2x10 both dir 8in 2x10 both dir 8 in 2x10 both dir                                                             Assessment:   At the end of the session pt reported that he was not having any pain in his L knee. He did struggle some with the weight for Q hip abduction but stated that he was not having any pain while completing the exercise. Overall he tolerated treatment well and had minimal difficulty completing the activities. He has continued to express that he feels the benefit of reduced medial patellar tracking from the McConnel taping. He was given a demonstration on how to tape himself at home/gym settings.         Plan:    Pt to schedule PT recheck to determine whether of not to continue treatment.  PT recheck scheduled on 4/7/2025 with Elham Fisher PT    Goals:  Active       L knee pain       1) Independent progressive home exercise program with 90% teach back capability by the patient (Progressing)       Start:  01/28/25    Expected End:  03/11/25            2) Pt to report L knee pain <5/10 with stair climbing and squatting to enable normal job performance  (Progressing)       Start:  01/28/25    Expected End:  03/11/25            3) Improve MMT of Lan LE's to 5/5 or better to improve joint stability with squatting (Progressing)       Start:  01/28/25    Expected End:  03/11/25            4) Functional Outcome LEFS score improves to >70/80 (Met)       Start:  01/28/25    Expected End:  03/11/25    Resolved:  02/25/25         5) Pt to perform >45 minutes of increased activities without difficulty or increased pain to return to normal recreational activities (Progressing)       Start:  01/28/25    " Expected End:  03/11/25

## 2025-03-27 ENCOUNTER — TELEPHONE (OUTPATIENT)
Facility: CLINIC | Age: 35
End: 2025-03-27
Payer: COMMERCIAL

## 2025-03-27 DIAGNOSIS — K52.9 CHRONIC DIARRHEA OF UNKNOWN ORIGIN: ICD-10-CM

## 2025-03-27 RX ORDER — DICYCLOMINE HYDROCHLORIDE 10 MG/1
10 CAPSULE ORAL 3 TIMES DAILY
Qty: 90 CAPSULE | Refills: 1 | Status: SHIPPED | OUTPATIENT
Start: 2025-03-27 | End: 2025-04-26

## 2025-04-02 ENCOUNTER — APPOINTMENT (OUTPATIENT)
Dept: BEHAVIORAL HEALTH | Facility: CLINIC | Age: 35
End: 2025-04-02
Payer: COMMERCIAL

## 2025-04-02 VITALS
SYSTOLIC BLOOD PRESSURE: 102 MMHG | DIASTOLIC BLOOD PRESSURE: 72 MMHG | TEMPERATURE: 98.4 F | HEIGHT: 67 IN | HEART RATE: 75 BPM | BODY MASS INDEX: 22.27 KG/M2 | RESPIRATION RATE: 16 BRPM | WEIGHT: 141.9 LBS

## 2025-04-02 DIAGNOSIS — Z79.899 MEDICATION MANAGEMENT: ICD-10-CM

## 2025-04-02 DIAGNOSIS — F10.11 ALCOHOL ABUSE, IN REMISSION: ICD-10-CM

## 2025-04-02 DIAGNOSIS — F33.1 MODERATE EPISODE OF RECURRENT MAJOR DEPRESSIVE DISORDER: ICD-10-CM

## 2025-04-02 DIAGNOSIS — F51.01 PRIMARY INSOMNIA: ICD-10-CM

## 2025-04-02 DIAGNOSIS — F41.1 GAD (GENERALIZED ANXIETY DISORDER): ICD-10-CM

## 2025-04-02 PROCEDURE — 99205 OFFICE O/P NEW HI 60 MIN: CPT | Performed by: REGISTERED NURSE

## 2025-04-02 PROCEDURE — 3008F BODY MASS INDEX DOCD: CPT | Performed by: REGISTERED NURSE

## 2025-04-02 PROCEDURE — 4274F FLU IMMUNO ADMIND RCVD: CPT | Performed by: REGISTERED NURSE

## 2025-04-02 ASSESSMENT — PATIENT HEALTH QUESTIONNAIRE - PHQ9
3. TROUBLE FALLING OR STAYING ASLEEP: NOT AT ALL
SUM OF ALL RESPONSES TO PHQ9 QUESTIONS 1 & 2: 4
10. IF YOU CHECKED OFF ANY PROBLEMS, HOW DIFFICULT HAVE THESE PROBLEMS MADE IT FOR YOU TO DO YOUR WORK, TAKE CARE OF THINGS AT HOME, OR GET ALONG WITH OTHER PEOPLE: VERY DIFFICULT
1. LITTLE INTEREST OR PLEASURE IN DOING THINGS: MORE THAN HALF THE DAYS
SUM OF ALL RESPONSES TO PHQ QUESTIONS 1-9: 14
9. THOUGHTS THAT YOU WOULD BE BETTER OFF DEAD, OR OF HURTING YOURSELF: NOT AT ALL
2. FEELING DOWN, DEPRESSED OR HOPELESS: MORE THAN HALF THE DAYS
6. FEELING BAD ABOUT YOURSELF - OR THAT YOU ARE A FAILURE OR HAVE LET YOURSELF OR YOUR FAMILY DOWN: MORE THAN HALF THE DAYS
7. TROUBLE CONCENTRATING ON THINGS, SUCH AS READING THE NEWSPAPER OR WATCHING TELEVISION: NEARLY EVERY DAY
5. POOR APPETITE OR OVEREATING: NEARLY EVERY DAY
8. MOVING OR SPEAKING SO SLOWLY THAT OTHER PEOPLE COULD HAVE NOTICED. OR THE OPPOSITE, BEING SO FIGETY OR RESTLESS THAT YOU HAVE BEEN MOVING AROUND A LOT MORE THAN USUAL: SEVERAL DAYS
4. FEELING TIRED OR HAVING LITTLE ENERGY: SEVERAL DAYS

## 2025-04-02 ASSESSMENT — ANXIETY QUESTIONNAIRES
1. FEELING NERVOUS, ANXIOUS, OR ON EDGE: MORE THAN HALF THE DAYS
7. FEELING AFRAID AS IF SOMETHING AWFUL MIGHT HAPPEN: NEARLY EVERY DAY
4. TROUBLE RELAXING: SEVERAL DAYS
5. BEING SO RESTLESS THAT IT IS HARD TO SIT STILL: MORE THAN HALF THE DAYS
IF YOU CHECKED OFF ANY PROBLEMS ON THIS QUESTIONNAIRE, HOW DIFFICULT HAVE THESE PROBLEMS MADE IT FOR YOU TO DO YOUR WORK, TAKE CARE OF THINGS AT HOME, OR GET ALONG WITH OTHER PEOPLE: VERY DIFFICULT
2. NOT BEING ABLE TO STOP OR CONTROL WORRYING: SEVERAL DAYS
6. BECOMING EASILY ANNOYED OR IRRITABLE: NEARLY EVERY DAY
GAD7 TOTAL SCORE: 14
3. WORRYING TOO MUCH ABOUT DIFFERENT THINGS: MORE THAN HALF THE DAYS

## 2025-04-02 ASSESSMENT — COLUMBIA-SUICIDE SEVERITY RATING SCALE - C-SSRS
1. HAVE YOU WISHED YOU WERE DEAD OR WISHED YOU COULD GO TO SLEEP AND NOT WAKE UP?: NO
TOTAL  NUMBER OF ABORTED OR SELF INTERRUPTED ATTEMPTS LIFETIME: NO
6. HAVE YOU EVER DONE ANYTHING, STARTED TO DO ANYTHING, OR PREPARED TO DO ANYTHING TO END YOUR LIFE?: NO
2. HAVE YOU ACTUALLY HAD ANY THOUGHTS OF KILLING YOURSELF?: NO
TOTAL  NUMBER OF INTERRUPTED ATTEMPTS LIFETIME: NO
ATTEMPT LIFETIME: NO

## 2025-04-02 NOTE — PROGRESS NOTES
"Virtual or Telephone Consent    An interactive audio and video telecommunication system which permits real time communications between the patient (at the originating site) and provider (at the distant site) was utilized to provide this telehealth service.   Verbal consent was requested and obtained from Chano Diaz on this date, 04/02/25 for a telehealth visit and the patient's location was confirmed at the time of the visit.    EDY Diaz is a 35 y.o. male patient with a chief complaint of   Chief Complaint   Patient presents with    Anxiety    Depression    Insomnia    Med Management    presenting to outpatient treatment for a scheduled psych outpatient psychiatric evaluation.    Chano explains that symptoms of anxiety and depression began in 2024.   Patient explains that during this time \"I was seeing my neurologist for a yearly check-up to follow-up about my history of seizures\". Patient explains that his neurologist completed a depression assessment and noticed that symptoms have worsened from the previous year. He was prescribed Sertraline and Buspirone to manage anxiety and depression symptoms. Chano is unaware of what triggers his anxiety and depression but does acknowledge that he has a history of alcohol dependence. Chano has had two seizures in 2019 and triggers of seizure are unknown if it is related to alcohol. Chano has not had any seizures since 2019 and is currently stable on seizure medications. Despite working as a , Chano has discontinued alcohol use and is in early stages of alcohol remission as of December 2024. Recently, Chano has expressed to his neurologist that Sertraline has caused decreased libido. Patient also began seeing a psychotherapist and was referred to outpatient psychiatry for an ADHD evaluation due to inability to focus.   The duration of symptoms occurred for one year.    Aggravating factors of anxiety and depression are life's stressors.  Relieving factors of " anxiety and depression are gardening.  Patient ranks the severity of anxiety using the MAGAN 7 scale with a rating of 14 for moderate anxiety symptoms. Patient ranks the severity of depression using the PHQ 9 scale with a rating of 14 for moderate depressive symptoms. Patient does explain that current psychiatric medication regimen has been unsuccessful in the management of anxiety and depressive symptoms.     NOTE: Symptom scale is rated where 0 = no symptoms at all, and 10 = symptoms so severe that pt is an imminent danger to themselves or others and requires hospitalization.    Anxiety, Depression, and Focus issues remain(s) present more days than not, which has improved over the past few weeks. Chano MEDELLIN Diaz rates the severity of psych symptoms as a 6/10, noting symptom improvement with gardening and worsening of symptoms with life's stressors.    Psychiatric Review Of Systems:  -Mood:  Depressive Symptoms: appetite decreased, concentration, and energy  Manic Symptoms: negative  Anxiety Symptoms: General Anxiety Disorder (MAGAN)MAGAN Behaviors: difficult to control worry, excessive anxiety/worry, and difficulty concentrating  Psychotic Symptoms: negative  Other Symptoms:  -Sleep/Energy/Motivation: Sleep is good. Energy and motivation is poor  -Appetite/Weight Changes: Appetite is poor. No weight changes  -Psychosis: denies  -SI/HI: denies      HISTORY  PSYCH HISTORY  -Psych Hx: MAGAN, MDD, Insomnia  -Psych Hospitalization Hx: denies  -Suicide Attempt Hx: denies  -Self-Harm/Violence Hx: denies  -Current psych meds: Sertraline, Buspirone, Trazodone  -Psych Med Hx: Paxil    SUBSTANCE USE HISTORY  -Substance Use Hx: denies  -Alcohol: currently in remission since December 23024  -Tobacco: denies  -Caffeine: 3-4 espressos daily  -Substance Abuse Treatment Hx: denies    FAMILY HISTORY  -Family Psych Hx: denies  -Family Suicide Hx: denies  -Family Substance Abuse Hx: denies    SOCIAL HISTORY  -Supports: Therapist  -Housing:  lives alone  -Income: [employer]  -Education: Bachelor's degree  -Legal: denies  -Abuse Hx: denies    Past Medical History:   Diagnosis Date    Anxiety 11/15/2024    Depression 11/15/2024    HIV disease (Multi) 10/2024    Personal history of other specified conditions 03/10/2021    History of dysuria    Seizures (Multi) 8/2019       -PCP: Ambrose Candelario MD    -TBI/head trauma/LOC/seizure hx: past history of seizures    REVIEW OF SYSTEMS  Review of Systems   All other systems reviewed and are negative.    Objective   Physical Exam  Psychiatric:         Attention and Perception: Attention and perception normal.         Mood and Affect: Mood and affect normal.         Speech: Speech normal.         Behavior: Behavior normal. Behavior is cooperative.         Thought Content: Thought content normal.         Cognition and Memory: Cognition and memory normal.         Judgment: Judgment normal.       MEDICAL-DECISION MAKING  Begin Trintellix 10mg PO  Daily for anxiety and depression. Discontinue Sertraline 25mg Po Daily due to sexual side effects. Continue Buspirone 15mg PO TID for anxiety. Continue Trazodone 50mg PO HS PRN for insomnia. BAARS exam sent via email for ADHD evaluation. Patient is interested in non-stimulant ADHD medication and will be discussed at follow-up visit.  Patient educated and verbalized understanding on benefits, risks, and side effects of all psychiatric medications. Continue psychotherapy with Juan sosa [facility]. Follow-up with psychiatry in 4 weeks for medication evaluation and effectiveness.        Psych med regimen as follows:   1. Trintellix 10mg PO  Daily   2. Buspirone 15mg PO TID   3. Trazodone 50mg PO HS PRN       IMPRESSION  - MAGAN  - MDD, moderate severity  - Insomnia  - Alcohol Use Disorder, in early remisison  - Medication Management      SI/HI ASSESSMENT  -Risk Assessment: The pt is currently a low acute risk of suicide and self-harm due to no past suicide  attempt(s) and not currently endorsing thoughts of suicide. The pt is currently a low acute risk of violence and harm to others due to no past history of violence and not currently threatening others.  -Suicidal Risk Factors: , male, and current psychiatric illness  -Violence Risk Factors: male  -Protective Factors: strong coping skills and employment  -Plan to Reduce Risk: Establish medication regimen, outpatient follow-up care, and increase coping skills   Denied current suicidal ideation or thoughts of harm to self, denied homicidal ideation or thoughts of harm to others. No delusional thinking elicited. No perseverations or obsessions identified.       PLAN  -Continue Medications as directed.  -Follow-up with this provider in 4 weeks.  -Risks/benefits/assessment of medication interventions discussed with pt; pt agreeable to plan. Will continue to monitor for symptoms mgmt and SEs and adjust plan as needed.  -MI to increase coping skills/behavior regulation.  -Safety plan reviewed.  -Call  Psychiatry at [phone] with issues.  -For [facility] residents, [facility] is a 24/7 hotline you can call for assistance at [phone]. Please call 911 or go to your closest Emergency Room if you feel worse. This includes thoughts of hurting yourself or anyone else, or having other troubles such as hearing voices, seeing visions, or having new and scary thoughts about the people around you.    Reviewed OARRS on [04/02/25] by [Jun Andrade, CRISSY-CNP] -OARRS has been reviewed and is consistent with prescribed medications, Considered the risks of abuse, dependence, addiction and diversion, Medication is felt to be clinically appropriate based on documented diagnosis.    Office Visit on 02/12/2025   Component Date Value    CALPROTECTIN, STOOL 02/24/2025 <5     C-REACTIVE PROTEIN 02/12/2025 <3.0     CAMPYLOBACTER GROUP 02/24/2025 NOT DETECTED     SALMONELLA SPECIES 02/24/2025 NOT DETECTED     SHIGELLA  SPECIES 02/24/2025 NOT DETECTED     VIBRIO GROUP 02/24/2025 NOT DETECTED     YERSINIA ENTEROCOLITICA 02/24/2025 NOT DETECTED     SHIGA TOXIN 1 02/24/2025 NOT DETECTED     SHIGA TOXIN 2 02/24/2025 NOT DETECTED     NOROVIRUS GI/GII 02/24/2025 NOT DETECTED     ROTAVIRUS A 02/24/2025 NOT DETECTED     STRONGYLOIDES AB (IGG) 02/12/2025 NEGATIVE     PANCREATIC ELASTASE 1 02/24/2025 114 (L)     CLOSTRIDIUM DIFFICILE TO* 02/24/2025 NOT DETECTED     TISSUE TRANSGLUTAMINASE * 02/12/2025 <1.0     GLIADIN (DEAMIDATED) AB * 02/12/2025 <1.0     SED RATE BY MODIFIED JACINTO* 02/12/2025 2    Lab on 11/19/2024   Component Date Value    WBC 11/19/2024 5.4     nRBC 11/19/2024 0.0     RBC 11/19/2024 4.67     Hemoglobin 11/19/2024 15.7     Hematocrit 11/19/2024 46.5     MCV 11/19/2024 100     MCH 11/19/2024 33.6     MCHC 11/19/2024 33.8     RDW 11/19/2024 13.2     Platelets 11/19/2024 156     Glucose 11/19/2024 112 (H)     Sodium 11/19/2024 138     Potassium 11/19/2024 3.7     Chloride 11/19/2024 101     Bicarbonate 11/19/2024 32     Anion Gap 11/19/2024 9 (L)     Urea Nitrogen 11/19/2024 15     Creatinine 11/19/2024 0.87     eGFR 11/19/2024 >90     Calcium 11/19/2024 9.2     Albumin 11/19/2024 4.3     Alkaline Phosphatase 11/19/2024 84     Total Protein 11/19/2024 6.6     AST 11/19/2024 56 (H)     Bilirubin, Total 11/19/2024 0.7     ALT 11/19/2024 43        Last Urine Drug Screen / ordered today: No  No results found for this or any previous visit (from the past 8760 hours).  N/A          Controlled Substance Agreement:  Date of the Last Agreement: 4/2/25  Reviewed Controlled Substance Agreement including but not limited to the benefits, risks, and alternatives to treatment with a Controlled Substance medication(s).    Jun Andrade, APRN-CNP

## 2025-04-07 ENCOUNTER — DOCUMENTATION (OUTPATIENT)
Dept: NEUROLOGY | Facility: HOSPITAL | Age: 35
End: 2025-04-07
Payer: COMMERCIAL

## 2025-04-07 ENCOUNTER — TREATMENT (OUTPATIENT)
Dept: PHYSICAL THERAPY | Facility: HOSPITAL | Age: 35
End: 2025-04-07
Payer: COMMERCIAL

## 2025-04-07 DIAGNOSIS — M25.562 CHRONIC PAIN OF LEFT KNEE: ICD-10-CM

## 2025-04-07 DIAGNOSIS — M62.81 MUSCLE WEAKNESS OF LOWER EXTREMITY: ICD-10-CM

## 2025-04-07 DIAGNOSIS — G89.29 CHRONIC PAIN OF LEFT KNEE: ICD-10-CM

## 2025-04-07 PROCEDURE — 97110 THERAPEUTIC EXERCISES: CPT | Mod: GP | Performed by: PHYSICAL THERAPIST

## 2025-04-07 ASSESSMENT — PAIN SCALES - GENERAL: PAINLEVEL_OUTOF10: 0 - NO PAIN

## 2025-04-07 ASSESSMENT — PAIN - FUNCTIONAL ASSESSMENT: PAIN_FUNCTIONAL_ASSESSMENT: 0-10

## 2025-04-07 NOTE — PROGRESS NOTES
Physical Therapy    Physical Therapy Reassessment / Discharge Summary    Patient Name: Chano Diaz  MRN: [id number]  : 1990  Today's Date: 2025  Time Calculation  Start Time: 1458  Stop Time: 1539  Time Calculation (min): 41 min    PT Therapeutic Procedures Time Entry  Therapeutic Exercise Time Entry: 41        VISIT:# 14    Current Problem  Problem List Items Addressed This Visit             ICD-10-CM    Chronic pain of left knee M25.562, G89.29    Muscle weakness of lower extremity M62.81      Subjective   Reason for Referral: L knee pain  Referred By: Dr. Candelario  Past Medical History Relevant to Rehab: Seizure disorder, STI, anxiety/depression   Pt presents after 2 week hiatus from PT with reports of no L knee pain, except after busy nights at work. He has  been still wearing the knee brace at work, but has been taping the L knee when going to the gym with good results. No activity limitation reported now.     Pain  Pain Assessment: 0-10  0-10 (Numeric) Pain Score: 0 - No pain     Objective   Other Measures  Lower Extremity Funtional Score (LEFS): 75    Lower Extremity Strength:  LE strength not listed below is WNL  MMT 5/5 max  LEFT   Hip Flexion 5   Hip Abduction 5   Hip Adduction 5   Knee Extension 4+,5* ant knee   Knee Flexion 5   Ankle DF 5   Ankle PF 5   *denotes pain    Precautions  Precautions  Precautions Comment: none noted    Treatments:  EXERCISES 3/5/2025  3/18/25 3/21/2025  4/7/25   VISIT # #11 #12 #13 #14   HEP    Progressed to Indep gym program                 Bike 10' seat 6 10' seat 6 8' seat 6 6' Seat 5          Shuttle  DLP 7B 2x15 8B 2x15 8B 2x15    Shuttle SLP 6B 2x15 ea 6B 2x15 ea 7B 2x15 ea    Shuttle HR 6B 2x15 6B 2x15 7B 2x15           Qhip Flexion 20# 2x10 ea 25# 2x10 ea 25# 2x10 ea 30# x20 ea   Qhip Extension 20# 2x10 ea 25# 2x10 ea 25# 2x10 ea 40# 2x10 ea   Qhip Abduction 20# 2x10 ea 25# 2x10 ea 25# 2x10 ea 30# 2x10 ea   Qhip L TKE 30# 2x10 left 35# 2x10 left 35# 2x10 Left  "40#          Squat body mechanics 2x10 w/ Maxwell taping 2x10 w/ Maxwell taping 2x10 w/ Maxwell taping Reviewed   Lunge body mechanics    Educated & pt demoed          ITB stretch       Gastroc stretch 30\"x3 30\"x3 30\" x 3    Hamstring stretch              Bridges       Clamshells       Reverse clamshells              Step Ups FWD/LAT 8in 2x10 both dir 8in 2x10 both dir 8 in 2x10 both dir                   Assessment:   Pt's L LE strength is now WNL, but still slight pain with MMT for knee extension under the patella, as well as he reported some calf strain with full TKE in sitting. He demonstrated good understanding of the LE strength exercises that he'll continue with at the gym on his own. He is also continuing to tape the L knee for lateral patellar stability. Pt is discharged to [facility] gym program/HEP as all PT goals have been achieved.     Plan:    Discharge to [facility]    Goals:  Resolved       L knee pain       1) Independent progressive home exercise program with 90% teach back capability by the patient (Met)       Start:  01/28/25    Expected End:  03/11/25    Resolved:  04/07/25         2) Pt to report L knee pain <5/10 with stair climbing and squatting to enable normal job performance  (Met)       Start:  01/28/25    Expected End:  03/11/25    Resolved:  04/07/25         3) Improve MMT of Lan LE's to 5/5 or better to improve joint stability with squatting (Met)       Start:  01/28/25    Expected End:  03/11/25    Resolved:  04/07/25         4) Functional Outcome LEFS score improves to >70/80 (Met)       Start:  01/28/25    Expected End:  03/11/25    Resolved:  02/25/25         5) Pt to perform >45 minutes of increased activities without difficulty or increased pain to return to normal recreational activities (Met)       Start:  01/28/25    Expected End:  03/11/25    Resolved:  04/07/25            "

## 2025-04-07 NOTE — PROGRESS NOTES
Discussed via messages with psych APRN about possibly using depakote for mood and seizures with possible benefit to appetite. Attempted to call patient, left voicemail to call back or send me a message. Could potentially start on 250 mg Depakote ER BID, and up to 500 mg BID in 1 week. Will need labs to check level. Recent CMP reviewed. He does also have HIV but viral load is undetectable. LFTs are wnl. Could potentially come off Keppra if tolerating depakote well.     Will wait for patient to reach out on his thoughts.

## 2025-04-14 ENCOUNTER — APPOINTMENT (OUTPATIENT)
Dept: PRIMARY CARE | Facility: CLINIC | Age: 35
End: 2025-04-14
Payer: COMMERCIAL

## 2025-04-14 VITALS
HEIGHT: 67 IN | OXYGEN SATURATION: 97 % | SYSTOLIC BLOOD PRESSURE: 117 MMHG | DIASTOLIC BLOOD PRESSURE: 70 MMHG | BODY MASS INDEX: 22.13 KG/M2 | HEART RATE: 65 BPM | WEIGHT: 141 LBS | RESPIRATION RATE: 16 BRPM | TEMPERATURE: 97.1 F

## 2025-04-14 DIAGNOSIS — M54.2 NECK PAIN, MUSCULOSKELETAL: Primary | ICD-10-CM

## 2025-04-14 DIAGNOSIS — M25.562 CHRONIC PAIN OF LEFT KNEE: ICD-10-CM

## 2025-04-14 DIAGNOSIS — K52.9 CHRONIC DIARRHEA OF UNKNOWN ORIGIN: ICD-10-CM

## 2025-04-14 DIAGNOSIS — G89.29 CHRONIC PAIN OF LEFT KNEE: ICD-10-CM

## 2025-04-14 PROCEDURE — 4274F FLU IMMUNO ADMIND RCVD: CPT | Performed by: STUDENT IN AN ORGANIZED HEALTH CARE EDUCATION/TRAINING PROGRAM

## 2025-04-14 PROCEDURE — 3008F BODY MASS INDEX DOCD: CPT | Performed by: STUDENT IN AN ORGANIZED HEALTH CARE EDUCATION/TRAINING PROGRAM

## 2025-04-14 PROCEDURE — 4004F PT TOBACCO SCREEN RCVD TLK: CPT | Performed by: STUDENT IN AN ORGANIZED HEALTH CARE EDUCATION/TRAINING PROGRAM

## 2025-04-14 PROCEDURE — 99213 OFFICE O/P EST LOW 20 MIN: CPT | Performed by: STUDENT IN AN ORGANIZED HEALTH CARE EDUCATION/TRAINING PROGRAM

## 2025-04-14 RX ORDER — TIZANIDINE 2 MG/1
2 TABLET ORAL NIGHTLY PRN
Qty: 90 TABLET | Refills: 0 | Status: SHIPPED | OUTPATIENT
Start: 2025-04-14 | End: 2025-07-13

## 2025-04-14 SDOH — ECONOMIC STABILITY: FOOD INSECURITY: WITHIN THE PAST 12 MONTHS, THE FOOD YOU BOUGHT JUST DIDN'T LAST AND YOU DIDN'T HAVE MONEY TO GET MORE.: NEVER TRUE

## 2025-04-14 SDOH — ECONOMIC STABILITY: FOOD INSECURITY: WITHIN THE PAST 12 MONTHS, YOU WORRIED THAT YOUR FOOD WOULD RUN OUT BEFORE YOU GOT MONEY TO BUY MORE.: NEVER TRUE

## 2025-04-14 ASSESSMENT — PATIENT HEALTH QUESTIONNAIRE - PHQ9
4. FEELING TIRED OR HAVING LITTLE ENERGY: MORE THAN HALF THE DAYS
9. THOUGHTS THAT YOU WOULD BE BETTER OFF DEAD, OR OF HURTING YOURSELF: NOT AT ALL
7. TROUBLE CONCENTRATING ON THINGS, SUCH AS READING THE NEWSPAPER OR WATCHING TELEVISION: MORE THAN HALF THE DAYS
5. POOR APPETITE OR OVEREATING: MORE THAN HALF THE DAYS
SUM OF ALL RESPONSES TO PHQ QUESTIONS 1-9: 15
3. TROUBLE FALLING OR STAYING ASLEEP: MORE THAN HALF THE DAYS
2. FEELING DOWN, DEPRESSED OR HOPELESS: MORE THAN HALF THE DAYS
6. FEELING BAD ABOUT YOURSELF - OR THAT YOU ARE A FAILURE OR HAVE LET YOURSELF OR YOUR FAMILY DOWN: SEVERAL DAYS
SUM OF ALL RESPONSES TO PHQ9 QUESTIONS 1 & 2: 4
1. LITTLE INTEREST OR PLEASURE IN DOING THINGS: MORE THAN HALF THE DAYS
8. MOVING OR SPEAKING SO SLOWLY THAT OTHER PEOPLE COULD HAVE NOTICED. OR THE OPPOSITE, BEING SO FIGETY OR RESTLESS THAT YOU HAVE BEEN MOVING AROUND A LOT MORE THAN USUAL: MORE THAN HALF THE DAYS

## 2025-04-14 ASSESSMENT — LIFESTYLE VARIABLES
HOW MANY STANDARD DRINKS CONTAINING ALCOHOL DO YOU HAVE ON A TYPICAL DAY: PATIENT DOES NOT DRINK
HOW OFTEN DO YOU HAVE A DRINK CONTAINING ALCOHOL: NEVER
HOW OFTEN DO YOU HAVE SIX OR MORE DRINKS ON ONE OCCASION: NEVER
AUDIT-C TOTAL SCORE: 0
SKIP TO QUESTIONS 9-10: 1

## 2025-04-14 ASSESSMENT — ENCOUNTER SYMPTOMS
SHORTNESS OF BREATH: 0
PALPITATIONS: 0
FATIGUE: 0
MUSCULOSKELETAL NEGATIVE: 1
DIZZINESS: 0
DIARRHEA: 0
FEVER: 0
VOMITING: 0
ABDOMINAL PAIN: 0
NAUSEA: 0
CONFUSION: 0
CONSTIPATION: 0
COLOR CHANGE: 0
WHEEZING: 0
CHILLS: 0
UNEXPECTED WEIGHT CHANGE: 0
HEADACHES: 0
COUGH: 0

## 2025-04-14 NOTE — PROGRESS NOTES
"Subjective   Patient ID: Chano Diaz is a 35 y.o. male who presents for Follow-up (3 month follow up).    HPI   He is here for follow up viist. PMH: Seizure, HIV, anxiety, depression & Insomnia.   Reports he is doing okay, L knee improved after PT as ordered at LOV, completed the PT for now. Reports his L shoulder is also better, taking Zanaflex prn with help.   Reports his diarrhea been the same, saw GI as referred and has upcoming appt. He is doing stool work up & possible c'scopy in future. Also saw nutritionist for help with chronic diarrhea.     Review of Systems   Constitutional:  Negative for chills, fatigue, fever and unexpected weight change.   HENT: Negative.     Respiratory:  Negative for cough, shortness of breath and wheezing.    Cardiovascular:  Negative for chest pain, palpitations and leg swelling.   Gastrointestinal:  Negative for abdominal pain, constipation, diarrhea, nausea and vomiting.   Musculoskeletal: Negative.    Skin:  Negative for color change and rash.   Neurological:  Negative for dizziness and headaches.   Psychiatric/Behavioral:  Negative for behavioral problems and confusion.        Objective   /70 (BP Location: Right arm, Patient Position: Sitting, BP Cuff Size: Adult)   Pulse 65   Temp 36.2 °C (97.1 °F) (Temporal)   Resp 16   Ht 1.702 m (5' 7\")   Wt 64 kg (141 lb)   SpO2 97%   BMI 22.08 kg/m²     Physical Exam  Vitals and nursing note reviewed.   Constitutional:       Appearance: Normal appearance.      Comments: Well-appearing male   Cardiovascular:      Rate and Rhythm: Normal rate and regular rhythm.      Pulses: Normal pulses.      Heart sounds: Normal heart sounds.   Pulmonary:      Effort: Pulmonary effort is normal.      Breath sounds: Normal breath sounds.   Abdominal:      General: Abdomen is flat. Bowel sounds are normal.      Palpations: Abdomen is soft.   Musculoskeletal:         General: Normal range of motion.   Neurological:      General: No focal " deficit present.      Mental Status: He is alert.   Psychiatric:         Mood and Affect: Mood normal.         Behavior: Behavior normal.         Assessment/Plan   He is here for follow-up visit.  Appears his left knee pain improved after physical therapy; continue home therapy as learned during PT.  Neck pain is stable on as needed Zanaflex, continue same.  Continue follow-up with GI and nutritionist for ongoing diarrhea; encouraged fiber rich food and Imodium as needed.  Recommend to continue follow-up with other specialists as scheduled.  He is otherwise clinically and vitally stable.  Problem List Items Addressed This Visit             ICD-10-CM    Chronic pain of left knee M25.562, G89.29    Chronic diarrhea of unknown origin K52.9     Other Visit Diagnoses         Codes    Neck pain, musculoskeletal    -  Primary M54.2    Relevant Medications    tiZANidine (Zanaflex) 2 mg tablet          RTC 8 to 12 months for HM/follow-up    This note was partially generated using the Dragon Voice recognition system. There may be some incorrect wording, spelling and/or spelling errors or punctuation errors that were not corrected prior to committing the note to the medical record.      Ambrose Candelario MD    [facility]

## 2025-05-05 DIAGNOSIS — G40.909 SEIZURE DISORDER (MULTI): ICD-10-CM

## 2025-05-05 RX ORDER — LEVETIRACETAM 750 MG/1
750 TABLET ORAL 2 TIMES DAILY
Qty: 180 TABLET | Refills: 3 | Status: SHIPPED | OUTPATIENT
Start: 2025-05-05 | End: 2026-05-05

## 2025-05-05 NOTE — TELEPHONE ENCOUNTER
Rx Refill Request Telephone Encounter    Name:  Chano MEDELLIN Diaz  :  [id number]  Medication Name:  levetiracetam 750 mg bid            Specific Pharmacy location:  [facility]  Date of last appointment:  25  Date of next appointment:  25  Best number to reach patient:

## 2025-05-07 DIAGNOSIS — R19.5 LOW FECAL ELASTASE LEVEL: ICD-10-CM

## 2025-05-07 DIAGNOSIS — Z21 ASYMPTOMATIC HIV INFECTION, WITH NO HISTORY OF HIV-RELATED ILLNESS (MULTI): ICD-10-CM

## 2025-05-07 DIAGNOSIS — K52.9 CHRONIC DIARRHEA OF UNKNOWN ORIGIN: Primary | ICD-10-CM

## 2025-05-07 DIAGNOSIS — R68.89 ABNORMAL WEIGHT: ICD-10-CM

## 2025-05-07 DIAGNOSIS — F10.11 ALCOHOL ABUSE, IN REMISSION: ICD-10-CM

## 2025-05-15 ENCOUNTER — APPOINTMENT (OUTPATIENT)
Dept: BEHAVIORAL HEALTH | Facility: CLINIC | Age: 35
End: 2025-05-15
Payer: COMMERCIAL

## 2025-05-19 ENCOUNTER — APPOINTMENT (OUTPATIENT)
Dept: BEHAVIORAL HEALTH | Facility: CLINIC | Age: 35
End: 2025-05-19
Payer: COMMERCIAL

## 2025-06-17 ENCOUNTER — APPOINTMENT (OUTPATIENT)
Dept: BEHAVIORAL HEALTH | Facility: CLINIC | Age: 35
End: 2025-06-17
Payer: COMMERCIAL

## 2025-06-17 DIAGNOSIS — Z79.899 MEDICATION MANAGEMENT: ICD-10-CM

## 2025-06-17 DIAGNOSIS — F10.11 ALCOHOL ABUSE, IN REMISSION: ICD-10-CM

## 2025-06-17 DIAGNOSIS — Z00.00 HEALTHCARE MAINTENANCE: ICD-10-CM

## 2025-06-17 DIAGNOSIS — F51.01 PRIMARY INSOMNIA: ICD-10-CM

## 2025-06-17 DIAGNOSIS — F33.1 MODERATE EPISODE OF RECURRENT MAJOR DEPRESSIVE DISORDER: ICD-10-CM

## 2025-06-17 DIAGNOSIS — F41.1 GAD (GENERALIZED ANXIETY DISORDER): ICD-10-CM

## 2025-06-17 DIAGNOSIS — F90.2 ATTENTION DEFICIT HYPERACTIVITY DISORDER (ADHD), COMBINED TYPE: ICD-10-CM

## 2025-06-17 PROCEDURE — 4004F PT TOBACCO SCREEN RCVD TLK: CPT | Performed by: REGISTERED NURSE

## 2025-06-17 PROCEDURE — 99214 OFFICE O/P EST MOD 30 MIN: CPT | Performed by: REGISTERED NURSE

## 2025-06-17 PROCEDURE — 4274F FLU IMMUNO ADMIND RCVD: CPT | Performed by: REGISTERED NURSE

## 2025-06-17 NOTE — PROGRESS NOTES
Virtual or Telephone Consent    An interactive audio and video telecommunication system which permits real time communications between the patient (at the originating site) and provider (at the distant site) was utilized to provide this telehealth service.   Verbal consent was requested and obtained from Chano MEDELLIN [place] on this date, 06/17/25 for a telehealth visit and the patient's location was confirmed at the time of the visit.    An interactive audio and video telecommunication system which permits real time communications between the Chano MEDELLIN Diaz (at home) and MARICRUZ Russell (at office) was utilized to provide this telehealth service.     HPI  Chano Diaz is a 35 y.o. male patient with a chief complaint of   Chief Complaint   Patient presents with   • Anxiety   • Depression   • Insomnia   • Med Management    presenting to outpatient treatment for a scheduled psych outpatient psychiatric follow-up.    Chano presents as a follow-up anxiety, depression, and insomnia. Patient reports that symptoms have been managed with current medication regimen. BAARS exam completed to confirm diagnosis of ADHD, combined type. Patient continues to be in remission of alcohol and attending psychotherapy to assist with cravings. Patient will continue medication regimen.    NOTE: Symptom scale is rated where 0 = no symptoms at all, and 10 = symptoms so severe that pt is an imminent danger to themselves or others and requires hospitalization.     Anxiety, Depression, and Focus issues remain(s) present more days than not, which has improved over the past few weeks. Chano LACIE [place] rates the severity of psych symptoms as a 6/10, noting symptom improvement with gardening and worsening of symptoms with life's stressors.     Psychiatric Review Of Systems:  -Mood:  Depressive Symptoms: appetite decreased, concentration, and energy  Manic Symptoms: negative  Anxiety Symptoms: General Anxiety Disorder (MAGAN)MAGAN Behaviors: difficult to  control worry, excessive anxiety/worry, and difficulty concentrating  Psychotic Symptoms: negative  Other Symptoms:  -Sleep/Energy/Motivation: Sleep is good. Energy and motivation is poor  -Appetite/Weight Changes: Appetite is poor. No weight changes  -Psychosis: denies  -SI/HI: denies        HISTORY  PSYCH HISTORY  -Psych Hx: MAGAN, MDD, Insomnia  -Psych Hospitalization Hx: denies  -Suicide Attempt Hx: denies  -Self-Harm/Violence Hx: denies  -Current psych meds: Sertraline, Buspirone, Trazodone  -Psych Med Hx: Paxil     SUBSTANCE USE HISTORY  -Substance Use Hx: denies  -Alcohol: currently in remission since December 2024  -Tobacco: denies  -Caffeine: denies  -Substance Abuse Treatment Hx: denies     FAMILY HISTORY  -Family Psych Hx: denies  -Family Suicide Hx: denies  -Family Substance Abuse Hx: denies     SOCIAL HISTORY  -Supports: Therapist  -Housing: lives alone  -Income:  at [employer]  -Education: Bachelor's degree  -Legal: denies  -Abuse Hx: denies    Medical History[1]    -PCP: Ambrose Candelario MD    -TBI/head trauma/LOC/seizure hx: past history of seizures     REVIEW OF SYSTEMS  Review of Systems   All other systems reviewed and are negative.    Objective   Physical Exam  Psychiatric:         Attention and Perception: Attention and perception normal.         Mood and Affect: Mood and affect normal.         Speech: Speech normal.         Behavior: Behavior normal. Behavior is cooperative.         Thought Content: Thought content normal.         Cognition and Memory: Cognition and memory normal.         Judgment: Judgment normal.         MEDICAL-DECISION MAKING  Begin Adderall XR 20mg PO Daily for ADHD (pending UDS). Continue Trintellix 10mg PO Daily for anxiety and depression. Continue Buspirone 15mg PO TID for anxiety. Continue Trazodone 50mg PO HS PRN for insomnia. Patient educated and verbalized understanding on benefits, risks, and side effects of all psychiatric medications. Continue psychotherapy  with Juan at [facility]. Follow-up with psychiatry in 4 weeks for medication evaluation and effectiveness.         Psych med regimen as follows:              1. Trintellix 10mg PO  Daily              2. Buspirone 15mg PO TID              3. Trazodone 50mg PO HS PRN   4. Adderall XR 20mg PO Daily for ADHD (pending UDS)                   IMPRESSION  - MAGAN  - MDD, moderate severity  - ADHD, combined type  - Insomnia  - Alcohol Use Disorder, in early remisison  - Medication Management        SI/HI ASSESSMENT  -Risk Assessment: The pt is currently a low acute risk of suicide and self-harm due to no past suicide attempt(s) and not currently endorsing thoughts of suicide. The pt is currently a low acute risk of violence and harm to others due to no past history of violence and not currently threatening others.  -Suicidal Risk Factors: , male, and current psychiatric illness  -Violence Risk Factors: male  -Protective Factors: strong coping skills and employment  -Plan to Reduce Risk: Establish medication regimen, outpatient follow-up care, and increase coping skills   Denied current suicidal ideation or thoughts of harm to self, denied homicidal ideation or thoughts of harm to others. No delusional thinking elicited. No perseverations or obsessions identified.       PLAN  -Continue Medications as directed.  -Follow-up with this provider in 4 weeks.  -Risks/benefits/assessment of medication interventions discussed with pt; pt agreeable to plan. Will continue to monitor for symptoms mgmt and SEs and adjust plan as needed.  -MI to increase coping skills/behavior regulation.  -Safety plan reviewed.  -Call  Psychiatry at [phone] with issues.  -For [facility] residents, [employer] is a 24/7 hotline you can call for assistance at [phone]. Please call 911 or go to your closest Emergency Room if you feel worse. This includes thoughts of hurting yourself or anyone else, or having other  troubles such as hearing voices, seeing visions, or having new and scary thoughts about the people around you.    Reviewed OARRS on [06/17/25] by [CHELLY Hoyos] -OARRS has been reviewed and is consistent with prescribed medications, Considered the risks of abuse, dependence, addiction and diversion, Medication is felt to be clinically appropriate based on documented diagnosis.    Office Visit on 02/12/2025   Component Date Value   • CALPROTECTIN, STOOL 02/24/2025 <5    • C-REACTIVE PROTEIN 02/12/2025 <3.0    • CAMPYLOBACTER GROUP 02/24/2025 NOT DETECTED    • SALMONELLA SPECIES 02/24/2025 NOT DETECTED    • SHIGELLA SPECIES 02/24/2025 NOT DETECTED    • VIBRIO GROUP 02/24/2025 NOT DETECTED    • YERSINIA ENTEROCOLITICA 02/24/2025 NOT DETECTED    • SHIGA TOXIN 1 02/24/2025 NOT DETECTED    • SHIGA TOXIN 2 02/24/2025 NOT DETECTED    • NOROVIRUS GI/GII 02/24/2025 NOT DETECTED    • ROTAVIRUS A 02/24/2025 NOT DETECTED    • STRONGYLOIDES AB (IGG) 02/12/2025 NEGATIVE    • PANCREATIC ELASTASE 1 02/24/2025 114 (L)    • CLOSTRIDIUM DIFFICILE TO* 02/24/2025 NOT DETECTED    • TISSUE TRANSGLUTAMINASE * 02/12/2025 <1.0    • GLIADIN (DEAMIDATED) AB * 02/12/2025 <1.0    • SED RATE BY MODIFIED JACINTO* 02/12/2025 2        Last Urine Drug Screen / ordered today: Yes  No results found for this or any previous visit (from the past 8760 hours).  N/A          Controlled Substance Agreement:  Date of the Last Agreement: 4/2/25  Reviewed Controlled Substance Agreement including but not limited to the benefits, risks, and alternatives to treatment with a Controlled Substance medication(s).    Review with patient: Treatment plan reviewed with the patient.  Medication risks/benefit reviewed with the patient  Time Spent:    Prep time: 5  Direct patient time: 15  Documentation time: 10  Total time: 30    CHELLY Hoyos         [1]  Past Medical History:  Diagnosis Date   • Anxiety 11/15/2024   • Depression 11/15/2024   • HIV  disease (Multi) 10/2024   • Personal history of other specified conditions 03/10/2021    History of dysuria   • Seizures (Multi) 8/2019

## 2025-06-22 LAB
AMPHET UR-MCNC: NORMAL NG/ML
AMPHETAMINES UR QL: NEGATIVE NG/ML
BARBITURATES UR QL: NEGATIVE NG/ML
BENZODIAZ UR QL: NEGATIVE NG/ML
BZE UR QL: NEGATIVE NG/ML
CREAT UR-MCNC: 76 MG/DL
DRUG SCREEN COMMENT UR-IMP: NORMAL
DRUG SCREEN COMMENT UR-IMP: NORMAL
FENTANYL UR QL SCN: NEGATIVE NG/ML
MDA UR-MCNC: NORMAL UG/ML
MDEA UR-MCNC: NORMAL NG/ML
MDMA UR-MCNC: NORMAL NG/ML
METHADONE UR QL: NEGATIVE NG/ML
METHAMPHET UR-MCNC: NORMAL UG/ML
OPIATES UR QL: NEGATIVE NG/ML
OXIDANTS UR QL: NEGATIVE MCG/ML
OXYCODONE UR QL: NEGATIVE NG/ML
PCP UR QL: NEGATIVE NG/ML
PH UR: 6.6 [PH] (ref 4.5–9)
PHENTERMINE UR-MCNC: NORMAL UG/ML
QUEST NOTES AND COMMENTS: NORMAL
QUEST PATIENT HISTORICAL REPORT: NORMAL
QUEST RITALINIC ACID URINE: NORMAL
THC UR QL: NEGATIVE NG/ML

## 2025-06-24 DIAGNOSIS — Z00.00 HEALTHCARE MAINTENANCE: ICD-10-CM

## 2025-06-24 DIAGNOSIS — Z79.899 MEDICATION MANAGEMENT: ICD-10-CM

## 2025-06-26 LAB
AMPHET UR-MCNC: NEGATIVE NG/ML
AMPHETAMINES UR QL: NEGATIVE NG/ML
BARBITURATES UR QL: NEGATIVE NG/ML
BENZODIAZ UR QL: NEGATIVE NG/ML
BZE UR QL: NEGATIVE NG/ML
CREAT UR-MCNC: 76 MG/DL
DRUG SCREEN COMMENT UR-IMP: NORMAL
FENTANYL UR QL SCN: NEGATIVE NG/ML
MDA UR-MCNC: NEGATIVE NG/ML
MDEA UR-MCNC: NEGATIVE NG/ML
MDMA UR-MCNC: NEGATIVE NG/ML
METHADONE UR QL: NEGATIVE NG/ML
METHAMPHET UR-MCNC: NEGATIVE NG/ML
OPIATES UR QL: NEGATIVE NG/ML
OXIDANTS UR QL: NEGATIVE MCG/ML
OXYCODONE UR QL: NEGATIVE NG/ML
PCP UR QL: NEGATIVE NG/ML
PH UR: 6.6 [PH] (ref 4.5–9)
PHENTERMINE UR-MCNC: NEGATIVE NG/ML
QUEST NOTES AND COMMENTS: NORMAL
QUEST RITALINIC ACID URINE: NEGATIVE NG/ML
THC UR QL: NEGATIVE NG/ML

## 2025-06-27 ENCOUNTER — TELEPHONE (OUTPATIENT)
Dept: BEHAVIORAL HEALTH | Facility: CLINIC | Age: 35
End: 2025-06-27
Payer: COMMERCIAL

## 2025-07-09 ENCOUNTER — TELEMEDICINE (OUTPATIENT)
Dept: NEUROLOGY | Facility: HOSPITAL | Age: 35
End: 2025-07-09
Payer: COMMERCIAL

## 2025-07-09 DIAGNOSIS — F32.A ANXIETY AND DEPRESSION: ICD-10-CM

## 2025-07-09 DIAGNOSIS — F41.9 ANXIETY AND DEPRESSION: ICD-10-CM

## 2025-07-09 DIAGNOSIS — F51.04 CHRONIC INSOMNIA: ICD-10-CM

## 2025-07-09 DIAGNOSIS — G40.909 SEIZURE DISORDER (MULTI): Primary | ICD-10-CM

## 2025-07-09 PROCEDURE — 4274F FLU IMMUNO ADMIND RCVD: CPT | Performed by: NURSE PRACTITIONER

## 2025-07-09 PROCEDURE — 4004F PT TOBACCO SCREEN RCVD TLK: CPT | Performed by: NURSE PRACTITIONER

## 2025-07-09 PROCEDURE — 99214 OFFICE O/P EST MOD 30 MIN: CPT | Performed by: NURSE PRACTITIONER

## 2025-07-09 NOTE — ASSESSMENT & PLAN NOTE
Continue Keppra 750 mg BID   Can consider alternative in the future if needed (ie. Depakote for dual purpose vs other)

## 2025-07-09 NOTE — PROGRESS NOTES
[facility]    SUBJECTIVE    Chano Diaz is a 35 y.o. right-handed male who presents with No chief complaint on file.       Presents for follow up visit  Visit type: virtual visit Virtual or Telephone Consent    An interactive audio and video telecommunication system which permits real time communications between the patient (at the originating site) and provider (at the distant site) was utilized to provide this telehealth service.   Verbal consent was requested and obtained from Chano Diaz on this date, 07/09/25 for a telehealth visit and the patient's location was confirmed at the time of the visit.    HISTORY OF PRESENT ILLNESS    RECAP:  Former patient of Dr. Solis. First saw 6/1/22 for sz. Prev pt of Dr Olson. 2014 apparently had aseptic meningitis, HIV diagnosed during that admission. Started on anti-retroviral, viral load had been undetectable since. 2019 started having sz. Semiology: blank out, collapse, convulse x30 seconds, wake up disoriented, don't become coherent of surroundings for ~10-15 min. Has had two sz lifetime. 8/2019 and 12/2019. ER started on levetiracetam 500mg bid the second time. Never had any since levetiracetam started. Both sz he states he was under a lot of physical and emotional stress. 2nd sz alcohol may have been a factor. Currently on levetiracetam 500mg 1.5 tabs bid. Doing well. No sz. Dose increased due to low levetiracetam level to current dose. No dizziness. No tiredness. No SE. HIV, on antiretrovirals. Undetectable viral load since 2014. CD4 and CD8 good. I do not have any records of this. Some days he drinks etoh, some days not. Most days drinks etoh. Drinks 1-10 cans beer/day, or glasses of wine, or shots of liquor. Doesn't want to quit. No HA. No prior hx known seizure (none prior to 2019). Never was on AED as a child. Born FT. No developmental delay. Smoke tobacco, 1/2 ppd. No street drug use, no marijuana. During last visit, discussed alcohol  "cessation and change LVT to 750mg pill, continue 1 tab bid.     2019 EEG normal   2019 MRI brain wwo normal     ID doctor: [facility] Dr Armand Ervin     Last sz 2019. On LVT 750mg bid, doing well, denies SE.     Drinks 3-5 drinks \"a few times a week\". He is not interested in quitting alcohol. He states he has been \"moderating\" alcohol use.     No major medical issues since last visit. States HIV stable.      11/19/24 - Pt states he has had a rough year. Did not have insurance from march until november so missed his appt with me in July. C/o depression anxiety, and relationship issues. Not sleeping well, mind racing. No appetite.      No seizures. Patient denies any auras, shaking, jerking, convulsions, loss of time, zoning out, waking up with tongue or cheek bites, incontinence or unexplained bruising.     Stress is not improving. Reaching out to counselors/therapist to find one he feels comfortable with and accepts his insurance.       He is a . Works until 1-3 am, goes to bed 4-5 am, may not fall asleep 7-8 am. Sleeping 4-14 hours, depends.        Drinking more days than not recently. Between 2-10 drinks. Canned cocktails.      Taking keppra at 3 am and 3 pm typically. Tolerating well.      Continue Keppra, refills sent. Discussed improving possible triggers/factors that can decrease seizure threshold including sleep deprivation, alcohol use and stress. Will try to start some medications to help with stress and sleep. Discussed some options, will start sertraline due to his ETOH use, start at 25 mg prior to sleep. Start Buspar 5-10 mg up to TID PRN for anxiety. Pt agreeable to this. Cut back on ETOH use. Pt states understanding. Additionally discussed seizure rescue medication, Nayzilam ordered, given instructions on dosing and administration, copay card given.      01/06/25 - Presents on call alone.      Since last visit, Nayzilam was denied by insurance. This was appealed, have not heard " back.      Having more better days now per pt. Sleeping somewhat better but not consistently. Still some trouble falling asleep. Once asleep, sometimes still waking up.      Sertraline possibly helping some with the mood. Sober x 1 week. A little dizziness with it. Having decreased sex drive.      Buspar helping with general anxiety but noticing social anxiety, taking PRN but has noticed that he is consistently taking it 3 times per day. The social anxiety is significant.     Doing well with medications. Buspar is helping but still having anxiety and more social anxiety. Taking 5 mg TID consistently. Will increase to 10 mg TID.   Discussed increase in sertraline for anxiety as well but pt is having some decreased sex drive on it so will hold off.   Still having trouble sleeping, will try  mg trazodone PRN for sleep. Pt agreeable to this plan.   Discussed possible referral to psych NP in the future if needed. Pt states understanding.     02/19/25 - Sleep is better since starting the trazodone, falling asleep within 5-10 min, still tired overall though. Sleeping 6-8 hours.      Anxiety is better on the 10 mg buspirone 3 times per day but still present.      Depression is still there. Was seemingly better initially but in the past month is starting to  again.      Sober from alcohol x 8 weeks now.      Still sex drive issues with the sertraline, some improvement but still there.      Did not get nayzilam yet from pharmacy. I did print approval for him to show to pharmacist.    4/7/25 - Discussed via messages with psych APRN about possibly using depakote for mood and seizures with possible benefit to appetite. Attempted to call patient, left voicemail to call back or send me a message. Could potentially start on 250 mg Depakote ER BID, and up to 500 mg BID in 1 week. Will need labs to check level. Recent CMP reviewed. He does also have HIV but viral load is undetectable. LFTs are wnl. Could potentially come  off Keppra if tolerating depakote well.      Will wait for patient to reach out on his thoughts.     07/09/25 - presents on call alone today.     Doing well. Patient denies any auras, shaking, jerking, convulsions, loss of time, zoning out, waking up with tongue or cheek bites, incontinence or unexplained bruising.     Tolerating Keppra well.     LFTs and Cr wnl on March 2025 lab work.     REVIEW OF SYSTEMS:  10 point review of systems performed and is negative except as noted in the HPI.     Medical History[1]    No relevant family history has been documented for this patient.    Surgical History[2]    Social History     Substance and Sexual Activity   Drug Use Not Currently    Types: Marijuana     Tobacco Use: High Risk (7/9/2025)    Patient History     Smoking Tobacco Use: Every Day     Smokeless Tobacco Use: Never     Passive Exposure: Not on file     Alcohol Use: Not At Risk (4/14/2025)    AUDIT-C     Frequency of Alcohol Consumption: Never     Average Number of Drinks: Patient does not drink     Frequency of Binge Drinking: Never       Current Outpatient Medications   Medication Instructions    B complex-vitamin C-folic acid (Nephro-Dami Rx) 1- mg-mg-mcg tablet 1 tablet, Daily with breakfast    busPIRone (BUSPAR) 15 mg, oral, 3 times daily    cetirizine (ZyrTEC) 10 mg chewable tablet Daily    cholecalciferol (Vitamin D-3) 25 MCG (1000 UT) capsule Daily    CREATINE, BULK, MISC No dose, route, or frequency recorded.    dicyclomine (BENTYL) 10 mg, oral, 3 times daily    emtricitab/rilpiviri/tenof ala (ODEFSEY ORAL) Take by mouth.    levETIRAcetam (KEPPRA) 750 mg, oral, 2 times daily    loratadine (CLARITIN REDITABS) 10 mg, Daily    nasal spray midazolam (Nayzilam) 5 mg/spray (0.1 mL) spray,non-aerosol 1 spray, nasal, As needed    tiZANidine (ZANAFLEX) 2 mg, oral, Nightly PRN    traZODone (DESYREL) 50 mg, oral, Nightly PRN    vortioxetine (TRINTELLIX) 10 mg, oral, Daily         OBJECTIVE    There were no  vitals taken for this visit.      Physical Exam  Eyes:      General: Lids are normal.      Extraocular Movements: Extraocular movements intact.   Psychiatric:         Speech: Speech normal.       Neurological Exam  Mental Status  Awake, alert and oriented to person, place and time. Speech is normal. Language is fluent with no aphasia. Attention and concentration are normal. Fund of knowledge is appropriate for level of education.    Cranial Nerves  CN III, IV, VI: Extraocular movements intact bilaterally. Normal lids and orbits bilaterally.  CN VII: Full and symmetric facial movement.  CN VIII: Hearing is normal.    Motor   No abnormal involuntary movements.        No results found for this or any previous visit from the past 1825 days.      Lab Results   Component Value Date    WBC 5.4 11/19/2024    RBC 4.67 11/19/2024    HGB 15.7 11/19/2024    HCT 46.5 11/19/2024     11/19/2024     11/19/2024    K 3.7 11/19/2024     11/19/2024    BUN 15 11/19/2024    CREATININE 0.87 11/19/2024    EGFR >90 11/19/2024    CALCIUM 9.2 11/19/2024    ALKPHOS 84 11/19/2024    AST 56 (H) 11/19/2024    ALT 43 11/19/2024          ASSESSMENT & PLAN  Problem List Items Addressed This Visit       Seizure disorder (Multi)    Overview   Possibly generalized  In background of history of aseptic meningitis (2014) and ongoing alcohol abuse  No other AED  2019 EEG wnl, 2019 MRI brain wwo wnl  Last sz 2019 per pt  On LVT 750mg bid         Current Assessment & Plan   Continue Keppra 750 mg BID   Can consider alternative in the future if needed (ie. Depakote for dual purpose vs other)         Anxiety and depression    Overview   Following with psych NP  On Trintellix,, buspirone and trazodone         Chronic insomnia    Overview   Doing well on trazodone  mg PRN nightly         Current Assessment & Plan   Continue trazodone 50 mg nightly PRN          No need to rivera keppra level.  Refills on Keppra, buspar and trazodone not  currently needed.   Continue to follow up with Psych NP    FU in 1 year         Ne Alba, APRN-MURALI         [1]   Past Medical History:  Diagnosis Date    ADHD (attention deficit hyperactivity disorder) 5/19/2025    Anxiety 11/15/2024    Depression 11/15/2024    HIV disease (Multi) 10/2024    Personal history of other specified conditions 03/10/2021    History of dysuria    Seizures (Multi) 8/2019   [2]   Past Surgical History:  Procedure Laterality Date    CIRCUMCISION, PRIMARY  03/1991    COLON SURGERY  2016    FLEXIBLE SIGMOIDOSCOPY

## 2025-07-17 ENCOUNTER — APPOINTMENT (OUTPATIENT)
Dept: BEHAVIORAL HEALTH | Facility: CLINIC | Age: 35
End: 2025-07-17
Payer: COMMERCIAL

## 2025-07-17 DIAGNOSIS — F10.11 ALCOHOL ABUSE, IN REMISSION: ICD-10-CM

## 2025-07-17 DIAGNOSIS — F90.2 ATTENTION DEFICIT HYPERACTIVITY DISORDER (ADHD), COMBINED TYPE: ICD-10-CM

## 2025-07-17 DIAGNOSIS — Z79.899 MEDICATION MANAGEMENT: ICD-10-CM

## 2025-07-17 DIAGNOSIS — Z00.00 HEALTHCARE MAINTENANCE: ICD-10-CM

## 2025-07-17 DIAGNOSIS — F41.1 GAD (GENERALIZED ANXIETY DISORDER): ICD-10-CM

## 2025-07-17 DIAGNOSIS — F51.01 PRIMARY INSOMNIA: ICD-10-CM

## 2025-07-17 DIAGNOSIS — F33.1 MODERATE EPISODE OF RECURRENT MAJOR DEPRESSIVE DISORDER: ICD-10-CM

## 2025-07-17 PROCEDURE — 4274F FLU IMMUNO ADMIND RCVD: CPT | Performed by: REGISTERED NURSE

## 2025-07-17 PROCEDURE — 99214 OFFICE O/P EST MOD 30 MIN: CPT | Performed by: REGISTERED NURSE

## 2025-07-17 RX ORDER — DEXTROAMPHETAMINE SACCHARATE, AMPHETAMINE ASPARTATE MONOHYDRATE, DEXTROAMPHETAMINE SULFATE AND AMPHETAMINE SULFATE 5; 5; 5; 5 MG/1; MG/1; MG/1; MG/1
20 CAPSULE, EXTENDED RELEASE ORAL DAILY
Qty: 30 CAPSULE | Refills: 0 | Status: SHIPPED | OUTPATIENT
Start: 2025-07-17 | End: 2025-08-16

## 2025-07-17 NOTE — PROGRESS NOTES
Virtual or Telephone Consent    An interactive audio and video telecommunication system which permits real time communications between the patient (at the originating site) and provider (at the distant site) was utilized to provide this telehealth service.   Verbal consent was requested and obtained from hCano MEDELLIN [place] on this date, 07/17/25 for a telehealth visit and the patient's location was confirmed at the time of the visit.    An interactive audio and video telecommunication system which permits real time communications between the Chano LACIE [place] (at home) and MARICRUZ Russell (at office) was utilized to provide this telehealth service.     HPI  Chano Diaz is a 35 y.o. male patient with a chief complaint of   Chief Complaint   Patient presents with   • Anxiety   • Depression   • Insomnia   • Med Management    presenting to outpatient treatment for a scheduled psych outpatient psychiatric follow-up.    Chano presents as a follow-up anxiety, depression, and insomnia. Patient reports that symptoms have been managed with current medication regimen. Patient is to begin Adderall XR 20mg PO Daily this visit that has been delayed last visit. Patient continues to be in remission of alcohol and attending psychotherapy to assist with cravings. Patient will continue medication regimen.     NOTE: Symptom scale is rated where 0 = no symptoms at all, and 10 = symptoms so severe that pt is an imminent danger to themselves or others and requires hospitalization.     Anxiety, Depression, and Focus issues remain(s) present more days than not, which has improved over the past few weeks. Chano LACIE [place] rates the severity of psych symptoms as a 6/10, noting symptom improvement with gardening and worsening of symptoms with life's stressors.     Psychiatric Review Of Systems:  -Mood:  Depressive Symptoms: appetite decreased, concentration, and energy  Manic Symptoms: negative  Anxiety Symptoms: General Anxiety Disorder (MAGAN)MAGAN  Behaviors: difficult to control worry, excessive anxiety/worry, and difficulty concentrating  Psychotic Symptoms: negative  Other Symptoms:  -Sleep/Energy/Motivation: Sleep is good. Energy and motivation is poor  -Appetite/Weight Changes: Appetite is poor. No weight changes  -Psychosis: denies  -SI/HI: denies        HISTORY  PSYCH HISTORY  -Psych Hx: MAGAN, MDD, Insomnia  -Psych Hospitalization Hx: denies  -Suicide Attempt Hx: denies  -Self-Harm/Violence Hx: denies  -Current psych meds: Sertraline, Buspirone, Trazodone  -Psych Med Hx: Paxil     SUBSTANCE USE HISTORY  -Substance Use Hx: denies  -Alcohol: currently in remission since December 2024  -Tobacco: denies  -Caffeine: denies  -Substance Abuse Treatment Hx: denies     FAMILY HISTORY  -Family Psych Hx: denies  -Family Suicide Hx: denies  -Family Substance Abuse Hx: denies     SOCIAL HISTORY  -Supports: Therapist  -Housing: lives alone  -Income:  at [employer]  -Education: Bachelor's degree  -Legal: denies  -Abuse Hx: denies    Medical History[1]    -PCP: Ambrose Candelario MD    -TBI/head trauma/LOC/seizure hx: past history of seizures     REVIEW OF SYSTEMS  Review of Systems   All other systems reviewed and are negative.    Objective   Physical Exam    Psychiatric:         Attention and Perception: Attention and perception normal.         Mood and Affect: Mood and affect normal.         Speech: Speech normal.         Behavior: Behavior normal. Behavior is cooperative.         Thought Content: Thought content normal.         Cognition and Memory: Cognition and memory normal.         Judgment: Judgment normal.           MEDICAL-DECISION MAKING  Begin Adderall XR 20mg PO Daily for ADHD.  Continue Trintellix 10mg PO Daily for anxiety and depression. Continue Buspirone 15mg PO TID for anxiety. Continue Trazodone 50mg PO HS PRN for insomnia. Patient educated and verbalized understanding on benefits, risks, and side effects of all psychiatric medications.  Continue psychotherapy with Juan at [facility]. Follow-up with psychiatry in 4 weeks for medication evaluation and effectiveness.         Psych med regimen as follows:              1. Trintellix 10mg PO  Daily              2. Buspirone 15mg PO TID              3. Trazodone 50mg PO HS PRN              4. Adderall XR 20mg PO Daily for ADHD                   IMPRESSION  - MAGAN  - MDD, moderate severity  - ADHD, combined type  - Insomnia  - Alcohol Use Disorder, in early remisison  - Medication Management        SI/HI ASSESSMENT  -Risk Assessment: The pt is currently a low acute risk of suicide and self-harm due to no past suicide attempt(s) and not currently endorsing thoughts of suicide. The pt is currently a low acute risk of violence and harm to others due to no past history of violence and not currently threatening others.  -Suicidal Risk Factors: , male, and current psychiatric illness  -Violence Risk Factors: male  -Protective Factors: strong coping skills and employment  -Plan to Reduce Risk: Establish medication regimen, outpatient follow-up care, and increase coping skills   Denied current suicidal ideation or thoughts of harm to self, denied homicidal ideation or thoughts of harm to others. No delusional thinking elicited. No perseverations or obsessions identified.       PLAN  -Continue Medications as directed.  -Follow-up with this provider in 4 weeks.  -Risks/benefits/assessment of medication interventions discussed with pt; pt agreeable to plan. Will continue to monitor for symptoms mgmt and SEs and adjust plan as needed.  -MI to increase coping skills/behavior regulation.  -Safety plan reviewed.  -Call  Psychiatry at [phone] with issues.  -For [facility] residents, [employer] is a 24/7 hotline you can call for assistance at [phone]. Please call 911 or go to your closest Emergency Room if you feel worse. This includes thoughts of hurting yourself or anyone  else, or having other troubles such as hearing voices, seeing visions, or having new and scary thoughts about the people around you.    Reviewed OARRS on [07/17/25] by [CRISSY Hoyos-CNP] -OARRS has been reviewed and is consistent with prescribed medications, Considered the risks of abuse, dependence, addiction and diversion, Medication is felt to be clinically appropriate based on documented diagnosis.    Orders Only on 06/21/2025   Component Date Value   • AMPHETAMINE 06/21/2025 NEGATIVE    • METHAMPHETAMINE 06/21/2025 NEGATIVE    • PHENTERMINE 06/21/2025 NEGATIVE    • MDA 06/21/2025 NEGATIVE    • MDMA 06/21/2025 NEGATIVE    • MDEA 06/21/2025 NEGATIVE    • Fentanyl 06/21/2025 NEGATIVE    • Amphetamines 06/21/2025 NEGATIVE    • Barbiturates 06/21/2025 NEGATIVE    • Benzodiazepines 06/21/2025 NEGATIVE    • Cocaine Metabolite 06/21/2025 NEGATIVE    • Marijuana Metabolite 06/21/2025 NEGATIVE    • Methadone Metabolite 06/21/2025 NEGATIVE    • Opiates 06/21/2025 NEGATIVE    • Oxycodone 06/21/2025 NEGATIVE    • Phencyclidine 06/21/2025 NEGATIVE    • Creatinine 06/21/2025 76.0    • pH 06/21/2025 6.6    • Oxidant 06/21/2025 NEGATIVE    • Ritalinic Acid 06/21/2025 NEGATIVE    • Ritalinic Acid Comments 06/21/2025     • Notes and Comments 06/21/2025     Office Visit on 02/12/2025   Component Date Value   • CALPROTECTIN, STOOL 02/24/2025 <5    • C-REACTIVE PROTEIN 02/12/2025 <3.0    • CAMPYLOBACTER GROUP 02/24/2025 NOT DETECTED    • SALMONELLA SPECIES 02/24/2025 NOT DETECTED    • SHIGELLA SPECIES 02/24/2025 NOT DETECTED    • VIBRIO GROUP 02/24/2025 NOT DETECTED    • YERSINIA ENTEROCOLITICA 02/24/2025 NOT DETECTED    • SHIGA TOXIN 1 02/24/2025 NOT DETECTED    • SHIGA TOXIN 2 02/24/2025 NOT DETECTED    • NOROVIRUS GI/GII 02/24/2025 NOT DETECTED    • ROTAVIRUS A 02/24/2025 NOT DETECTED    • STRONGYLOIDES AB (IGG) 02/12/2025 NEGATIVE    • PANCREATIC ELASTASE 1 02/24/2025 114 (L)    • CLOSTRIDIUM DIFFICILE TO* 02/24/2025 NOT  DETECTED    • TISSUE TRANSGLUTAMINASE * 02/12/2025 <1.0    • GLIADIN (DEAMIDATED) AB * 02/12/2025 <1.0    • SED RATE BY MODIFIED JACINTO* 02/12/2025 2        Last Urine Drug Screen / ordered today: No  Recent Results (from the past 8760 hours)   Methylphenidate And Metabolite,Conf,Urine    Collection Time: 06/21/25 11:38 AM   Result Value Ref Range    Ritalinic Acid NEGATIVE <100 ng/mL    Ritalinic Acid Comments      Notes and Comments     Drug Screen, Urine With Reflex to Confirmation    Collection Time: 06/21/25 11:38 AM   Result Value Ref Range    Fentanyl NEGATIVE <0.5 ng/mL    Amphetamines NEGATIVE <500 ng/mL    Barbiturates NEGATIVE <300 ng/mL    Benzodiazepines NEGATIVE <100 ng/mL    Cocaine Metabolite NEGATIVE <150 ng/mL    Marijuana Metabolite NEGATIVE <20 ng/mL    Methadone Metabolite NEGATIVE <100 ng/mL    Opiates NEGATIVE <100 ng/mL    Oxycodone NEGATIVE <100 ng/mL    Phencyclidine NEGATIVE <25 ng/mL    Creatinine 76.0 > or = 20.0 mg/dL    pH 6.6 4.5 - 9.0    Oxidant NEGATIVE <200 mcg/mL   Amphetamine Confirm, Urine    Collection Time: 06/21/25 11:38 AM   Result Value Ref Range    AMPHETAMINE NEGATIVE <200 ng/mL    METHAMPHETAMINE NEGATIVE <200 ng/mL    PHENTERMINE NEGATIVE <200 ng/mL    MDA NEGATIVE <200 ng/mL    MDMA NEGATIVE <200 ng/mL    MDEA NEGATIVE <200 ng/mL     Results are as expected.           Controlled Substance Agreement:  Date of the Last Agreement: 4/2/25  Reviewed Controlled Substance Agreement including but not limited to the benefits, risks, and alternatives to treatment with a Controlled Substance medication(s).    Review with patient: Treatment plan reviewed with the patient.  Medication risks/benefit reviewed with the patient  Time Spent:    Prep time: 5  Direct patient time: 15  Documentation time: 10  Total time: 30    CHELLY Hoyos         [1]  Past Medical History:  Diagnosis Date   • ADHD (attention deficit hyperactivity disorder) 5/19/2025   • Anxiety 11/15/2024   •  Depression 11/15/2024   • HIV disease (Multi) 10/2024   • Personal history of other specified conditions 03/10/2021    History of dysuria   • Seizures (Multi) 8/2019

## 2025-07-30 DIAGNOSIS — M54.2 NECK PAIN, MUSCULOSKELETAL: ICD-10-CM

## 2025-08-02 RX ORDER — TIZANIDINE 2 MG/1
2 TABLET ORAL NIGHTLY PRN
Qty: 90 TABLET | Refills: 0 | Status: SHIPPED | OUTPATIENT
Start: 2025-08-02 | End: 2025-10-31

## 2025-08-05 ENCOUNTER — APPOINTMENT (OUTPATIENT)
Dept: NEUROLOGY | Facility: HOSPITAL | Age: 35
End: 2025-08-05
Payer: COMMERCIAL

## 2025-08-14 DIAGNOSIS — F90.2 ATTENTION DEFICIT HYPERACTIVITY DISORDER (ADHD), COMBINED TYPE: ICD-10-CM

## 2025-08-14 RX ORDER — DEXTROAMPHETAMINE SACCHARATE, AMPHETAMINE ASPARTATE MONOHYDRATE, DEXTROAMPHETAMINE SULFATE AND AMPHETAMINE SULFATE 5; 5; 5; 5 MG/1; MG/1; MG/1; MG/1
20 CAPSULE, EXTENDED RELEASE ORAL DAILY
Qty: 30 CAPSULE | Refills: 0 | Status: SHIPPED | OUTPATIENT
Start: 2025-08-14 | End: 2025-09-13

## 2025-08-19 ENCOUNTER — APPOINTMENT (OUTPATIENT)
Dept: NEUROLOGY | Facility: HOSPITAL | Age: 35
End: 2025-08-19
Payer: COMMERCIAL

## 2025-08-19 ENCOUNTER — APPOINTMENT (OUTPATIENT)
Dept: BEHAVIORAL HEALTH | Facility: CLINIC | Age: 35
End: 2025-08-19
Payer: COMMERCIAL

## 2025-08-22 ENCOUNTER — TELEMEDICINE (OUTPATIENT)
Dept: BEHAVIORAL HEALTH | Facility: CLINIC | Age: 35
End: 2025-08-22
Payer: COMMERCIAL

## 2025-08-22 DIAGNOSIS — F51.01 PRIMARY INSOMNIA: ICD-10-CM

## 2025-08-22 DIAGNOSIS — F10.11 ALCOHOL ABUSE, IN REMISSION: ICD-10-CM

## 2025-08-22 DIAGNOSIS — Z79.899 MEDICATION MANAGEMENT: ICD-10-CM

## 2025-08-22 DIAGNOSIS — F33.1 MODERATE EPISODE OF RECURRENT MAJOR DEPRESSIVE DISORDER: ICD-10-CM

## 2025-08-22 DIAGNOSIS — F90.2 ATTENTION DEFICIT HYPERACTIVITY DISORDER (ADHD), COMBINED TYPE: ICD-10-CM

## 2025-08-22 DIAGNOSIS — F41.1 GAD (GENERALIZED ANXIETY DISORDER): ICD-10-CM

## 2025-08-22 PROCEDURE — 99214 OFFICE O/P EST MOD 30 MIN: CPT | Performed by: REGISTERED NURSE

## 2025-08-22 PROCEDURE — 4274F FLU IMMUNO ADMIND RCVD: CPT | Performed by: REGISTERED NURSE

## 2025-09-11 ENCOUNTER — APPOINTMENT (OUTPATIENT)
Dept: BEHAVIORAL HEALTH | Facility: CLINIC | Age: 35
End: 2025-09-11
Payer: COMMERCIAL

## 2026-01-12 ENCOUNTER — APPOINTMENT (OUTPATIENT)
Dept: PRIMARY CARE | Facility: CLINIC | Age: 36
End: 2026-01-12
Payer: COMMERCIAL